# Patient Record
Sex: FEMALE | Race: WHITE | NOT HISPANIC OR LATINO | Employment: FULL TIME | URBAN - METROPOLITAN AREA
[De-identification: names, ages, dates, MRNs, and addresses within clinical notes are randomized per-mention and may not be internally consistent; named-entity substitution may affect disease eponyms.]

---

## 2022-03-20 NOTE — PATIENT INSTRUCTIONS
Pap every 3-5 years if normal,, exercise most days of week, obtain appropriate diet and hydration, Calcium 1000mg + 600 vit D daily, birth control as directed  Annual mammogram starting at age 34, monthly breast self exam    D/C adderall and start PNV prior to conception Discussed AP to track cycles, Basal temp ovulation predictor kits   Healthy diet If no conception after 6 months of actively trying to schedule appointment for further eval

## 2022-03-21 ENCOUNTER — OFFICE VISIT (OUTPATIENT)
Dept: OBGYN CLINIC | Facility: CLINIC | Age: 32
End: 2022-03-21
Payer: COMMERCIAL

## 2022-03-21 VITALS
BODY MASS INDEX: 27.9 KG/M2 | SYSTOLIC BLOOD PRESSURE: 128 MMHG | DIASTOLIC BLOOD PRESSURE: 80 MMHG | HEIGHT: 62 IN | WEIGHT: 151.6 LBS

## 2022-03-21 DIAGNOSIS — Z80.3 FH: BREAST CANCER: ICD-10-CM

## 2022-03-21 DIAGNOSIS — Z12.4 ENCOUNTER FOR PAPANICOLAOU SMEAR FOR CERVICAL CANCER SCREENING: ICD-10-CM

## 2022-03-21 DIAGNOSIS — Z01.419 ENCOUNTER FOR GYNECOLOGICAL EXAMINATION WITHOUT ABNORMAL FINDING: Primary | ICD-10-CM

## 2022-03-21 DIAGNOSIS — F90.9 ATTENTION DEFICIT HYPERACTIVITY DISORDER (ADHD), UNSPECIFIED ADHD TYPE: ICD-10-CM

## 2022-03-21 DIAGNOSIS — Z30.011 ENCOUNTER FOR PRESCRIPTION OF ORAL CONTRACEPTIVES: ICD-10-CM

## 2022-03-21 PROCEDURE — S0610 ANNUAL GYNECOLOGICAL EXAMINA: HCPCS | Performed by: NURSE PRACTITIONER

## 2022-03-21 RX ORDER — DEXTROAMPHETAMINE SACCHARATE, AMPHETAMINE ASPARTATE, DEXTROAMPHETAMINE SULFATE AND AMPHETAMINE SULFATE 2.5; 2.5; 2.5; 2.5 MG/1; MG/1; MG/1; MG/1
10 TABLET ORAL 2 TIMES DAILY
COMMUNITY
Start: 2022-02-28 | End: 2022-03-30

## 2022-03-21 RX ORDER — DESOGESTREL AND ETHINYL ESTRADIOL 0.15-0.03
1 KIT ORAL DAILY
Qty: 84 TABLET | Refills: 3 | Status: SHIPPED | OUTPATIENT
Start: 2022-03-21 | End: 2022-04-15 | Stop reason: SDUPTHER

## 2022-03-21 RX ORDER — DESOGESTREL AND ETHINYL ESTRADIOL 0.15-0.03
1 KIT ORAL DAILY
COMMUNITY
Start: 2022-01-30 | End: 2022-03-21 | Stop reason: SDUPTHER

## 2022-03-21 NOTE — PROGRESS NOTES
Assessment/Plan:  Pap every 3-5 years if normal,, exercise most days of week, obtain appropriate diet and hydration, Calcium 1000mg + 600 vit D daily, birth control as directed  Annual mammogram starting at age 34, monthly breast self exam    D/C adderall and start PNV prior to conception Discussed AP to track cycles, Basal temp ovulation predictor kits  Healthy diet If no conception after 6 months of actively trying to schedule appointment for further eval        Diagnoses and all orders for this visit:    Encounter for gynecological examination without abnormal finding  -     Thinprep Tis Pap (Refl) HPV mRNA E6/E7    Encounter for Papanicolaou smear for cervical cancer screening  -     Thinprep Tis Pap (Refl) HPV mRNA E6/E7    FH: breast cancer  Comments:  MGM in 76s MGGM 90s    Attention deficit hyperactivity disorder (ADHD), unspecified ADHD type  Comments:  Discontinue Aderall prior to conception    Encounter for prescription of oral contraceptives  -     Enskyce 0 15-30 MG-MCG per tablet; Take 1 tablet by mouth daily    Other orders  -     amphetamine-dextroamphetamine (ADDERALL) 10 mg tablet; Take 10 mg by mouth 2 (two) times a day  -     Discontinue: Enskyce 0 15-30 MG-MCG per tablet; Take 1 tablet by mouth daily  -     Multiple Vitamins-Minerals (WOMENS MULTI GUMMIES PO)  -     Fexofenadine-Pseudoephedrine (ALLEGRA-D 24 HOUR PO); Take 1 capsule by mouth daily          Subjective:      Patient ID: Dorinda Bonilla is a 33 y o  female  New pt here for annual gyn [id number] conception in next 6-12 months  H/o ADHD on Adderall On OCP periods monthly Doing well on Enskyce has issues with generic with mood and prolonged bleeding  Has been on OCP since 16  Periods prior to 16 she thinks were regular  Denies abdominal or pelvic pain  H/o right ovarian cystectomy Bowel and bladder are normal Last pap 2020 normal No h o abn pap or STD   for [address] [place] [address]          The following portions of the patient's history were reviewed and updated as appropriate: allergies, current medications, past family history, past medical history, past social history, past surgical history and problem list     Review of Systems   Constitutional: Negative for fatigue and unexpected weight change  Gastrointestinal: Negative for abdominal distention, abdominal pain, constipation and diarrhea  Genitourinary: Negative for difficulty urinating, dyspareunia, dysuria, frequency, genital sores, menstrual problem, pelvic pain, urgency, vaginal bleeding, vaginal discharge and vaginal pain  Neurological: Negative for headaches  Psychiatric/Behavioral: Negative  Negative for dysphoric mood  The patient is not nervous/anxious  Objective:      /80   Ht 5' 2" (1 575 m)   Wt 68 8 kg (151 lb 9 6 oz)   LMP 03/21/2022 (Exact Date)   Breastfeeding No   BMI 27 73 kg/m²          Physical Exam  Vitals and nursing note reviewed  Constitutional:       General: She is not in acute distress  Appearance: Normal appearance  HENT:      Head: Normocephalic and atraumatic  Pulmonary:      Effort: Pulmonary effort is normal    Chest:   Breasts: Breasts are symmetrical       Right: Normal  No mass, nipple discharge, skin change, tenderness or axillary adenopathy  Left: Normal  No mass, nipple discharge, skin change, tenderness or axillary adenopathy  Abdominal:      General: There is no distension  Palpations: Abdomen is soft  Tenderness: There is no abdominal tenderness  There is no guarding or rebound  Genitourinary:     General: Normal vulva  Exam position: Lithotomy position  Labia:         Right: No rash, tenderness, lesion or injury  Left: No rash, tenderness, lesion or injury  Urethra: No prolapse, urethral pain, urethral swelling or urethral lesion  Vagina: Normal  No erythema or lesions        Cervix: No cervical motion tenderness, discharge, lesion or cervical bleeding  Uterus: Normal        Adnexa: Right adnexa normal and left adnexa normal         Right: No mass or tenderness  Left: No mass or tenderness  Rectum: No mass or external hemorrhoid  Comments: Start of menses PAP from cervix   Musculoskeletal:         General: Normal range of motion  Lymphadenopathy:      Upper Body:      Right upper body: No axillary adenopathy  Left upper body: No axillary adenopathy  Lower Body: No right inguinal adenopathy  No left inguinal adenopathy  Skin:     General: Skin is warm and dry  Neurological:      Mental Status: She is alert and oriented to person, place, and time  Psychiatric:         Mood and Affect: Mood normal          Behavior: Behavior normal          Thought Content:  Thought content normal          Judgment: Judgment normal

## 2022-03-23 LAB
CLINICAL INFO: NORMAL
CYTO CVX: NORMAL
CYTOLOGY CMNT CVX/VAG CYTO-IMP: NORMAL
DATE PREVIOUS BX: NORMAL
LMP START DATE: NORMAL
SL AMB PREV. PAP:: NORMAL
SPECIMEN SOURCE CVX/VAG CYTO: NORMAL

## 2023-02-24 ENCOUNTER — TELEPHONE (OUTPATIENT)
Dept: OBGYN CLINIC | Facility: CLINIC | Age: 33
End: 2023-02-24

## 2023-02-24 NOTE — TELEPHONE ENCOUNTER
2/24/23 patient has an upcoming FPN appt at 9:20 am with nurse and 10:00 am with Dr Darius Steinberg  This is patient's first pregnancy and she is a current pt of our so no MR needed

## 2023-03-28 ENCOUNTER — INITIAL PRENATAL (OUTPATIENT)
Dept: OBGYN CLINIC | Facility: CLINIC | Age: 33
End: 2023-03-28

## 2023-03-28 VITALS
DIASTOLIC BLOOD PRESSURE: 70 MMHG | SYSTOLIC BLOOD PRESSURE: 128 MMHG | HEIGHT: 62 IN | BODY MASS INDEX: 29.63 KG/M2 | WEIGHT: 161 LBS

## 2023-03-28 DIAGNOSIS — O09.511 PRIMIGRAVIDA OF ADVANCED MATERNAL AGE IN FIRST TRIMESTER: ICD-10-CM

## 2023-03-28 DIAGNOSIS — O09.519 ADVANCED MATERNAL AGE, PRIMIGRAVIDA, ANTEPARTUM: Primary | ICD-10-CM

## 2023-03-28 DIAGNOSIS — Z3A.08 8 WEEKS GESTATION OF PREGNANCY: ICD-10-CM

## 2023-03-28 DIAGNOSIS — Z71.85 VACCINE COUNSELING: ICD-10-CM

## 2023-03-28 DIAGNOSIS — G43.009 MIGRAINE WITHOUT AURA AND WITHOUT STATUS MIGRAINOSUS, NOT INTRACTABLE: ICD-10-CM

## 2023-03-28 DIAGNOSIS — N91.2 AMENORRHEA: ICD-10-CM

## 2023-03-28 DIAGNOSIS — Z36.89 ENCOUNTER FOR OTHER SPECIFIED ANTENATAL SCREENING: Primary | ICD-10-CM

## 2023-03-28 LAB
EXTERNAL CHLAMYDIA SCREEN: NEGATIVE
EXTERNAL GONORRHEA SCREEN: NEGATIVE
SL AMB  POCT GLUCOSE, UA: NEGATIVE
SL AMB POCT URINE PROTEIN: NEGATIVE

## 2023-03-28 NOTE — ASSESSMENT & PLAN NOTE
H/o migraines - usually around periods  Takes OTC medication  None since conceived  Discussed OTC acetaminophen and caffeine 200mg daily, PRN as first-line for headache in pregnancy  Encouraged to consider initiation of magnesium 400 mg PO daily and vitamin B2 400 mg PO daily for migraine prevention

## 2023-03-28 NOTE — PATIENT INSTRUCTIONS
- Call office if severe pain, bleeding or leaking of fluid  - Continue prenatal vitamin and all prescribed medications  To decrease risk of Preeclampsia in pregnancy - please take baby aspirin (162mg) daily at night from 12 weeks until 36 weeks of pregnancy      Supplements to prevent or decrease headaches/migraines in pregnancy:   - Magnesium 400mg oral daily   - Vit B2 400mg oral daily

## 2023-03-28 NOTE — ASSESSMENT & PLAN NOTE
Reviewed age makes patient Advanced Maternal Age (AMA) in pregnancy  Increase in chromosomal disorders, hypertension in pregnancy/preeclampsia, gestational diabetes in pregnancy,  birth and stillbirth  Reviewed options for aneuploidy testing  To decrease risk of Preeclampsia in pregnancy - please take baby aspirin (162mg) daily at night from 12 weeks until 36 weeks of pregnancy

## 2023-03-28 NOTE — PROGRESS NOTES
OB INTAKE INTERVIEW  Patient is 27 y  o who presents for OB intake at 8 5wks  She is accompanied by: Spouse  The father of her baby Wilmer Stephenson) is involved in the pregnancy    Last Menstrual Period: 2023  Ultrasound: Measured 8 weeks 5 days on 2023  Estimated Date of Delivery: 2023 confirmed by ultrasound  Signs/Symptoms of Pregnancy  Current pregnancy symptoms: Fatigue, breast tenderness, nausea, no vomiting  Constipation YES  Headaches no  Cramping/spotting no  PICA cravings no    Diabetes-  Body mass index is 29 45 kg/m²  If patient has 1 or more, please order early 1 hour GTT  History of GDM no  BMI >30 no  History of PCOS or current metformin use no  History of LGA/macrosomic infant (4000g/9lbs) no    If patient has 2 or more, please order early 1 hour GTT  AMA YES  First degree relative with type 2 diabetes no  History of chronic HTN, hyperlipidemia, elevated A1C no  High risk race (, , ,  or ) no    Hypertension- if you answer yes, please order preeclampsia labs (cbc, comprehensive metabolic panel, urine protein creatinine ratio, uric acid)  History of of chronic HTN no  History of gestational HTN no  History of preeclampsia, eclampsia, or HELLP syndrome no  History of diabetes no  History of lupus, autoimmune disease, kidney disease, antiphospholipid syndrome, rheumatoid arthritis, sjogren's syndrome no    Thyroid- if yes order TSH with reflex T4 (Unless TSH value on file within last 4 weeks then do not need to order)  History of thyroid disease no    Bleeding Disorder or Hx of DVT-patient or first degree relative with history of  Order the following if not done previously     (Factor V, antithrombin III, prothrombin gene mutation, protein C and S Ag, lupus anticoagulant, anticardiolipin, beta-2 glycoprotein)   no    OB/GYN-  History of abnormal pap smear no  History of HPV no  History of Herpes/HSV no  History of other STI (gonorrhea, chlamydia, trich) (or current partner with Hep B, Hep C, or HIV) no  History of prior  no  History of prior  no  History of  delivery prior to 36 weeks 6 days no  History of blood transfusion no  Ok for blood transfusion -Yes    Substance screening-   History of tobacco use no  Currently using tobacco no  Currently using alcohol no  Presently using drugs no  Past drug use (if yes, order urine drug screening panel)  no  IV drug use (if yes, order urine drug screening panel) no  Partner drug use (if yes, order urine drug screening panel) no  Parent/Family drug use (do not order urine drug screening panel just for family hx) no    Depression Screening-  PHQ-9 Score: (4)     MRSA Screening-   Does the pt have a hx of MRSA? no  If yes- please follow MRSA protocol and obtain a nasal swab for MRSA culture at 12 week visit  Immunizations:  Influenza vaccine given this season (ask date) Yes, 2022   Discussed Tdap vaccine (ask date) -Yes  Discussed COVID Vaccine (request pt upload card or bring to next visit) Yes,vaccinated  Genetic/MFM-  Do you or your partner have a history of any of the following in yourselves or first degree relatives? Cystic fibrosis no  Spinal muscular atrophy no  Hemoglobinopathy/Sickle Cell/Thalassemia no  Fragile X Intellectual Disability no    If yes, discuss carrier screening and recommend consultation with MFM/genetic counseling  If no, discuss option for carrier screening and/or genetic testing with Nuchal Ultrasound  Patient interested -Yes pt interested in the NIPT and declined CF and SMA carrier screening     Appointment at [address] -Patient will call to schedule    Interview education  [facility] Pregnancy Essentials Book reviewed and discussed -Yes      Prenatal lab work scripts -Yes    Extra labs ordered:  None     The patient has a history now or in prior pregnancy notable for:  None      Details that I feel the provider should be aware of: ADHD, Migraines     The patient was oriented to our practice, reviewed delivering physicians and [place] in [facility] for Delivery  All questions were answered      Interviewed by: Kofi John RN

## 2023-03-28 NOTE — PROGRESS NOTES
First OB Visit  [address] [place] [address], [address], [facility], [address] [facility]    Assessment/Plan:  Jonas Cuellar is a 27y o  year old  at 6w9d who presents for initial prenatal visit  Final NATA: 2023, by Last Menstrual Period      Supervision of normal pregnancy  - Aneuploidy screening discussed  Patient is undecided regarding aneuploidy screening   - Routine cervical cancer screening: Pap Up to date  - Routine STI Screening: GC/Chlamydia sent today  HIV/Hep B/Hep C/Syphilis ordered in prenatal panel   - Patient Education: Patient was counseled regarding diet, exercise, weight gain, foods to avoid, vaccines in pregnancy, aneuploidy screening, travel precautions to include seat belt use and VTE risk reduction  She has been provided our pregnancy packet which includes how and when to contact providers, medication recommendations, dietary suggestions, breastfeeding information as well as websites for additional information, hospital and delivery concerns  Additional Pregnancy Problems:   1  Advanced maternal age, primigravida, antepartum  Assessment & Plan:  Reviewed age makes patient Advanced Maternal Age (AMA) in pregnancy  Increase in chromosomal disorders, hypertension in pregnancy/preeclampsia, gestational diabetes in pregnancy,  birth and stillbirth  Reviewed options for aneuploidy testing  To decrease risk of Preeclampsia in pregnancy - please take baby aspirin (162mg) daily at night from 12 weeks until 36 weeks of pregnancy  2  Migraine without aura and without status migrainosus, not intractable  Assessment & Plan:  H/o migraines - usually around periods  Takes OTC medication  None since conceived  Discussed OTC acetaminophen and caffeine 200mg daily, PRN as first-line for headache in pregnancy  Encouraged to consider initiation of magnesium 400 mg PO daily and vitamin B2 400 mg PO daily for migraine prevention         3  Vaccine counseling  Comments:  Has had Covid vaccine in past but not recent Bivalent booster  A/P:   Reviewed increase risk of maternal disease to patients who contract covid during pregnancy, increase risk complications and hospitalization  Updated covid 19 booster is recommended in pregnancy, safe and effective, in all trimesters   Informed and encouraged to get updated booster at local pharmacy  4  Amenorrhea  -     AMB US OB < 14 weeks single or first gestation level 1    5  8 weeks gestation of pregnancy      MFM referral given for early anatomy and aneuploidy screening, due 11-13 weeks  Next OB visit: 4 weeks    Subjective:   CC:  Desires prenatal care  Asha Henry is a 27 y o  [place] female who presents for prenatal care  Patient's last menstrual period was 2023  Which would make her 8 weeks and 5 days pregnant today  Pregnancy ROS: no leakage of fluid, pelvic pain, or vaginal bleeding  mild nausea, no vomiting  OB History    Para Term  AB Living   1 0 0 0 0 0   SAB IAB Ectopic Multiple Live Births   0 0 0 0 0      # Outcome Date GA Lbr Chu/2nd Weight Sex Delivery Anes PTL Lv   1 Current                The following portions of the patient's history were reviewed and updated as appropriate: She  has a past medical history of ADHD, Migraine (2006), and Seasonal allergies  She  has a past surgical history that includes Ovarian cyst removal (Right, ); Rotator cuff repair (Right, ); and [place] tooth extraction ()  Her family history includes Breast cancer in her maternal grandmother and other; Cancer in her maternal grandfather; Diabetes in her maternal grandfather and paternal grandmother; Diabetes type II in her paternal grandmother; Heart disease in her maternal grandfather; Migraines in her mother  She  reports that she has never smoked  She has never used smokeless tobacco  She reports that she does not currently use alcohol   She reports that she does not use drugs  Current Outpatient Medications   Medication Sig Dispense Refill   • Prenatal MV & Min w/FA-DHA (ONE A DAY PRENATAL PO) Take by mouth       No current facility-administered medications for this visit  She has No Known Allergies         Objective:  LMP 2023   Pregravid Weight/BMI: 73 kg (161 lb) (BMI 29 44)  Current Weight:     Total Weight Gain: 0 kg (0 lb)   Pre- Vitals    Flowsheet Row Most Recent Value   Prenatal Assessment    Fetal Heart Rate 180   Movement Absent   Prenatal Vitals    Urine Albumin/Glucose    Dilation/Effacement/Station    Cervical Dilation 0   Cervical Effacement 0   Fetal Station -5   Vaginal Drainage    Draining Fluid No   Edema          General: Well appearing, no distress  Breasts: Normal bilaterally, nontender without masses, asymmetry, or nipple discharge  Abdomen: Soft, gravid, nontender  : Urethra normal  Normal labia majora and minora  Vagina normal   No vaginal bleeding  No vaginal discharge  Cervix closed  Uterus non-tender  Extremities: Warm and well perfused  Non tender  No edema      Ultrasound: ultrasound confirmed SLIUP, see report for details

## 2023-03-29 LAB
C TRACH RRNA SPEC QL NAA+PROBE: NOT DETECTED
N GONORRHOEA RRNA SPEC QL NAA+PROBE: NOT DETECTED

## 2023-03-31 LAB
EXTERNAL ANTIBODY SCREEN: NORMAL
EXTERNAL HEMOGLOBIN: 11.9 G/DL
EXTERNAL HEPATITIS B SURFACE ANTIGEN: NORMAL
EXTERNAL HIV-1/2 AB-AG: NORMAL
EXTERNAL PLATELET COUNT: 308 K/ÂΜL
EXTERNAL RH FACTOR: POSITIVE
EXTERNAL RUBELLA IGG QUANTITATION: NORMAL
EXTERNAL SYPHILIS TOTAL IGG/IGM SCREENING: NORMAL

## 2023-04-01 LAB
ABO GROUP BLD: NORMAL
APPEARANCE UR: CLEAR
BACTERIA UR QL AUTO: NORMAL /HPF
BASOPHILS # BLD AUTO: 38 CELLS/UL (ref 0–200)
BASOPHILS NFR BLD AUTO: 0.5 %
BILIRUB UR QL STRIP: NEGATIVE
BLD GP AB SCN SERPL QL: NORMAL
COLOR UR: YELLOW
EOSINOPHIL # BLD AUTO: 68 CELLS/UL (ref 15–500)
EOSINOPHIL NFR BLD AUTO: 0.9 %
ERYTHROCYTE [DISTWIDTH] IN BLOOD BY AUTOMATED COUNT: 12.4 % (ref 11–15)
GLUCOSE UR QL STRIP: NEGATIVE
HBV SURFACE AG SERPL QL IA: NORMAL
HCT VFR BLD AUTO: 36 % (ref 35–45)
HCV AB S/CO SERPL IA: <0.02
HCV AB SERPL QL IA: NORMAL
HGB BLD-MCNC: 11.9 G/DL (ref 11.7–15.5)
HGB UR QL STRIP: NEGATIVE
HIV 1+2 AB+HIV1 P24 AG SERPL QL IA: NORMAL
HYALINE CASTS #/AREA URNS LPF: NORMAL /LPF
KETONES UR QL STRIP: NEGATIVE
LEUKOCYTE ESTERASE UR QL STRIP: NEGATIVE
LYMPHOCYTES # BLD AUTO: 2257 CELLS/UL (ref 850–3900)
LYMPHOCYTES NFR BLD AUTO: 29.7 %
MCH RBC QN AUTO: 30.2 PG (ref 27–33)
MCHC RBC AUTO-ENTMCNC: 33.1 G/DL (ref 32–36)
MCV RBC AUTO: 91.4 FL (ref 80–100)
MONOCYTES # BLD AUTO: 646 CELLS/UL (ref 200–950)
MONOCYTES NFR BLD AUTO: 8.5 %
NEUTROPHILS # BLD AUTO: 4590 CELLS/UL (ref 1500–7800)
NEUTROPHILS NFR BLD AUTO: 60.4 %
NITRITE UR QL STRIP: NEGATIVE
PH UR STRIP: 7.5 [PH] (ref 5–8)
PLATELET # BLD AUTO: 308 THOUSAND/UL (ref 140–400)
PMV BLD REES-ECKER: 10.6 FL (ref 7.5–12.5)
PROT UR QL STRIP: NEGATIVE
RBC # BLD AUTO: 3.94 MILLION/UL (ref 3.8–5.1)
RBC #/AREA URNS HPF: NORMAL /HPF
RH BLD: NORMAL
RPR SER QL: NORMAL
RUBV IGG SERPL IA-ACNC: 2.68 INDEX
SP GR UR STRIP: 1 (ref 1–1.03)
SQUAMOUS #/AREA URNS HPF: NORMAL /HPF
WBC # BLD AUTO: 7.6 THOUSAND/UL (ref 3.8–10.8)
WBC #/AREA URNS HPF: NORMAL /HPF

## 2023-04-25 ENCOUNTER — ROUTINE PRENATAL (OUTPATIENT)
Dept: OBGYN CLINIC | Facility: CLINIC | Age: 33
End: 2023-04-25

## 2023-04-25 VITALS — DIASTOLIC BLOOD PRESSURE: 56 MMHG | WEIGHT: 164.6 LBS | SYSTOLIC BLOOD PRESSURE: 116 MMHG | BODY MASS INDEX: 30.11 KG/M2

## 2023-04-25 DIAGNOSIS — G43.009 MIGRAINE WITHOUT AURA AND WITHOUT STATUS MIGRAINOSUS, NOT INTRACTABLE: ICD-10-CM

## 2023-04-25 DIAGNOSIS — O09.519 ADVANCED MATERNAL AGE, PRIMIGRAVIDA, ANTEPARTUM: Primary | ICD-10-CM

## 2023-04-25 DIAGNOSIS — Z3A.12 12 WEEKS GESTATION OF PREGNANCY: ICD-10-CM

## 2023-04-25 LAB
SL AMB  POCT GLUCOSE, UA: NEGATIVE
SL AMB POCT URINE PROTEIN: NEGATIVE

## 2023-04-25 RX ORDER — DOCUSATE SODIUM 100 MG/1
100 CAPSULE, LIQUID FILLED ORAL 2 TIMES DAILY
COMMUNITY

## 2023-04-25 NOTE — PROGRESS NOTES
Please refer to the [facility] ultrasound report in Ob Procedures for additional information regarding today's visit

## 2023-04-25 NOTE — PATIENT INSTRUCTIONS
NUTRITION IN PREGNANCY  Good Nutrition is a VERY important part of having a healthy pregnancy and healthy baby  You should follow a healthy diet which include the following: * Vegetables (which are dark green and leafy): at least 2 servings each day   * Protein (meat, eggs, beans, nuts, peanut butter): 3-4 servings each day   * Breads/whole grains (bread, pasta, rice, tortillas, potatoes): 3 servings each day   * Dairy (milk, yogurt, cheese): 3-4 servings each day   * Water: 6-8 glasses per day   * Calories: approximately 2000 to 2200 calories per day     WEIGHT GAIN   Recommended weight gain for you during your pregnancy is based on your body mass index (BMI) at the time that you became pregnant  Pre-pregnant BMI Recommended weight gain   Underweight (BMI less than 18 5) 28 to 40 pounds   Normal weight (BMI 18 5-24 9) 25 to 35 pounds   Overweight (BMI 25-29 9) 15 to 25 pounds   Obese (BMI 30 or greater) 11 to 20 pounds     FOOD SAFETY   It is VERY important to eat only safely-prepared foods during pregnancy as you and your baby have a higher risk than usual for being affected by foodborne illnesses    Follow these steps to ensure that you and your baby are safe from foodborne illnesses while you are pregnant:   wash hands thoroughly with warm water and soap before and after handling any foods   wash cutting boards, dishes, utensils, and countertops with hot water and soap before and after preparing any foods   rinse raw fruits and vegetables thoroughly under running water before eating   keep raw meat and seafood separate from other foods and use different cutting boards/utensils to handle raw meat than for other foods   put cooked food on a freshly clean plate   cook all of your foods thoroughly   discard foods that have been left out for more than 2 hours   refrigerate or freeze any foods than can spoil     There are three particular foodborne risks that you should be aware of and avoid as they can cause serious harm to your unborn child  * Listeria (a harmful bacteria)   don’t eat hot dogs or deli meats (unless they’re reheated until steaming hot)   don’t eat soft cheeses (such as Feta, Brie, Camembert) unless they are specifically labeled as being “made with pasteurized milk”   don’t drink raw (unpasteurized) milk   don’t eat refrigerated pates or meat spreads   don’t eat refrigerated smoked seafood unless it’s in a cooked dish like a casserole     * Mercury (a metal which is found in certain fish in high levels)   don’t eat shark, tilefish, sydnie mackerel, or swordfish   don’t eat more than 12 ounces per week of shrimp, salmon, pollock, or catfish   when eating tuna fish, you can have up to 6 ounces per week of canned albacore tuna OR up to 12 ounces of canned light tuna     * Toxoplasma (a harmful parasite)   cook meat thoroughly before eating   wear gloves when gardening or handling sand from a child’s sandbox   if you ha tve a cat, have someone else change the litter box while you are pregnant      if you HAVE to clean it yourself, be sure to wash your hands thoroughly afterwards with warm water and soap    don’t get a NEW cat while you are pregnant

## 2023-04-25 NOTE — PROGRESS NOTES
Routine Prenatal Visit  [address] [facility], [address], [facility], [address] [facility]    Assessment/Plan:  Pancho Greene is a 27y o  year old  at 12w5d who presents for routine prenatal visit  1  Advanced maternal age, primigravida, antepartum    2  12 weeks gestation of pregnancy    3  Migraine without aura and without status migrainosus, not intractable      Labs reviewed   MFM  Consult  Tomorrow   No  Bleeding,  + fatigue  To start   ASA  162 mg   This week     Subjective:     CC: Prenatal care    Hernandez Batista is a 27 y o  [place]ildegard Reges female who presents for routine prenatal care at 12w5d  Pregnancy ROS: no  leakage of fluid, pelvic pain, or vaginal bleeding  The following portions of the patient's history were reviewed and updated as appropriate: allergies, current medications, past family history, past medical history, obstetric history, gynecologic history, past social history, past surgical history and problem list       Objective:  /56 (BP Location: Left arm, Patient Position: Sitting, Cuff Size: Standard)   Wt 74 7 kg (164 lb 9 6 oz)   LMP 2023   BMI 30 11 kg/m²   Pregravid Weight/BMI: 73 kg (161 lb) (BMI 29 44)  Current Weight: 74 7 kg (164 lb 9 6 oz)   Total Weight Gain: 1 633 kg (3 lb 9 6 oz)   Pre- Vitals    Flowsheet Row Most Recent Value   Prenatal Assessment    Fetal Heart Rate 162   Movement Absent   Prenatal Vitals    Blood Pressure 116/56   Weight - Scale 74 7 kg (164 lb 9 6 oz)   Urine Albumin/Glucose    Dilation/Effacement/Station    Vaginal Drainage    Edema    LLE Edema None   RLE Edema None   Facial Edema None           General: Well appearing, no distress  Respiratory: Unlabored breathing  Cardiovascular: Regular rate  Abdomen: Soft, gravid, nontender  Fundal Height: Appropriate for gestational age  Extremities: Warm and well perfused  Non tender

## 2023-04-26 ENCOUNTER — ROUTINE PRENATAL (OUTPATIENT)
Dept: PERINATAL CARE | Facility: OTHER | Age: 33
End: 2023-04-26

## 2023-04-26 VITALS
WEIGHT: 163.8 LBS | DIASTOLIC BLOOD PRESSURE: 70 MMHG | HEIGHT: 62 IN | BODY MASS INDEX: 30.14 KG/M2 | SYSTOLIC BLOOD PRESSURE: 136 MMHG | HEART RATE: 83 BPM

## 2023-04-26 DIAGNOSIS — Z3A.12 12 WEEKS GESTATION OF PREGNANCY: ICD-10-CM

## 2023-04-26 DIAGNOSIS — Z36.89 ENCOUNTER FOR OTHER SPECIFIED ANTENATAL SCREENING: ICD-10-CM

## 2023-04-26 DIAGNOSIS — O09.511 ELDERLY PRIMIGRAVIDA, FIRST TRIMESTER: Primary | ICD-10-CM

## 2023-04-26 RX ORDER — ASPIRIN 81 MG/1
162 TABLET, CHEWABLE ORAL
Qty: 180 TABLET | Refills: 1 | Status: SHIPPED | OUTPATIENT
Start: 2023-04-26 | End: 2023-07-25

## 2023-04-26 NOTE — LETTER
April 26, 2023     Eugenio Gruber MD  [facility] [address]  [address] [address]  [place] [id number]    Patient: Attila Longoria   YOB: 1990   Date of Visit: 4/26/2023       Dear Dr Alek Irwin: Thank you for referring Attila Longoria to me for evaluation  Below are my notes for this consultation  If you have questions, please do not hesitate to call me  I look forward to following your patient along with you           Sincerely,        Marliyn Smith MD        CC: No Recipients  Marilyn Smith MD  4/25/2023  4:37 PM  Sign when Signing Visit  Please refer to the [facility] ultrasound report in Ob Procedures for additional information regarding today's visit

## 2023-05-02 ENCOUNTER — PATIENT MESSAGE (OUTPATIENT)
Dept: OBGYN CLINIC | Facility: CLINIC | Age: 33
End: 2023-05-02

## 2023-05-02 NOTE — PATIENT COMMUNICATION
Patient returned call this morning  Patient currently 13 5wks pregnant  She states that it was one time she saw the dark brown discharge in the water  She states when she went to the bathroom again today and wiped she did not see any further discharge  Denies vaginal irritation/burning, abnormal vaginal odor, itching, or further discharge/spotting with wiping or in the toilet  The cramping/stretching in the pelvic region is on and off and not severe; advised her it is her round ligament stretching but it should not be consistent or severe doubled of pain  Advised her brown blood/discharge is indicator of old blood working it way out but should never be bright red color which indicate active bleeding and we need to know about  Advised her to continue to monitor and if she develop doubled over pain, vaginal irritation, abnormal vaginal odor, worsening/increased discolored discharge, or bleeding/spotting bright red to please give us a call back  She voiced appreciation

## 2023-05-26 ENCOUNTER — ROUTINE PRENATAL (OUTPATIENT)
Dept: OBGYN CLINIC | Facility: CLINIC | Age: 33
End: 2023-05-26

## 2023-05-26 VITALS — WEIGHT: 168 LBS | DIASTOLIC BLOOD PRESSURE: 60 MMHG | BODY MASS INDEX: 30.73 KG/M2 | SYSTOLIC BLOOD PRESSURE: 102 MMHG

## 2023-05-26 DIAGNOSIS — Z36.89 ENCOUNTER FOR OTHER SPECIFIED ANTENATAL SCREENING: ICD-10-CM

## 2023-05-26 DIAGNOSIS — Z3A.17 17 WEEKS GESTATION OF PREGNANCY: ICD-10-CM

## 2023-05-26 DIAGNOSIS — O09.519 ADVANCED MATERNAL AGE, PRIMIGRAVIDA, ANTEPARTUM: Primary | ICD-10-CM

## 2023-05-26 LAB
SL AMB  POCT GLUCOSE, UA: NEGATIVE
SL AMB POCT URINE PROTEIN: NEGATIVE

## 2023-05-26 NOTE — PROGRESS NOTES
Routine Prenatal Visit  [address] [facility], [address], [facility], [address]    Assessment/Plan:  Blair Bowens is a 27y o  year old  at 17w1d who presents for routine prenatal visit  1  Advanced maternal age, primigravida, antepartum  Assessment & Plan:  - Continue  mg PO daily until 36 weeks  2  17 weeks gestation of pregnancy  Assessment & Plan:  - Prenatal lab results reviewed  - Aneuploidy/ONTD screening reviewed  Patient's NIPS results low-risk  Order for msAFP provided today  - First trimester MFM consult report reviewed  Level II ultrasound is scheduled  - Problem list updated, results console reviewed and updated with pertinent prenatal labs  - PMH, PSH, medications reviewed and updated as needed  - Return to office in 4 wk(s) for routine prenatal care  Orders:  -     POCT urine dip    3  Encounter for other specified  screening  -     Alpha fetoprotein, maternal; Future  -     Alpha fetoprotein, maternal          Subjective:   Nely Marcial is a 27 y o   who presents for routine prenatal care at 17w1d  Complaints today: NO  LOF: No; VB: No; Contractions: No    Objective:  /60   Wt 76 2 kg (168 lb)   LMP 2023   BMI 30 73 kg/m²     General: Well appearing, no distress  Respiratory: Unlabored breathing  Cardiovascular: Regular rate  Abdomen: Soft, gravid, nontender  Extremities: Warm and well perfused  Non tender      Pregravid Weight/BMI: 73 kg (161 lb) (BMI 29 44)  Current Weight: 76 2 kg (168 lb)   Total Weight Gain: 3 175 kg (7 lb)     Pre- Vitals    Flowsheet Row Most Recent Value   Prenatal Assessment    Fetal Heart Rate 145   Movement Absent   Prenatal Vitals    Blood Pressure 102/60   Weight - Scale 76 2 kg (168 lb)   Urine Albumin/Glucose    Dilation/Effacement/Station    Vaginal Drainage    Draining Fluid No   Edema    LLE Edema None   RLE Edema None   Facial Edema None           Jodeen Boeck, MD  5/26/2023 11:28 AM

## 2023-05-26 NOTE — ASSESSMENT & PLAN NOTE
- Prenatal lab results reviewed  - Aneuploidy/ONTD screening reviewed  Patient's NIPS results low-risk  Order for msAFP provided today  - First trimester MFM consult report reviewed  Level II ultrasound is scheduled  - Problem list updated, results console reviewed and updated with pertinent prenatal labs  - PMH, PSH, medications reviewed and updated as needed  - Return to office in 4 wk(s) for routine prenatal care

## 2023-06-02 LAB
# FETUSES US: 1
AFP ADJ MOM SERPL: 0.97
AFP INTERP SERPL-IMP: NORMAL
AFP SERPL-MCNC: 39.3 NG/ML
AGE: NORMAL
DONATED EGG PATIENT QL: NO
GA CLIN EST: 17.9 WEEKS
GA METHOD: NORMAL
HX OF NTD NARR: NO
HX OF TRISOMY 21 NARR: NO
IDDM PATIENT QL: NO
NEURAL TUBE DEFECT RISK FETUS: NORMAL %
SL AMB REPEAT SPECIMEN: NO

## 2023-06-15 NOTE — PATIENT INSTRUCTIONS
Thank you for choosing us for your  care today  If you have any questions about your ultrasound or care, please do not hesitate to contact us or your primary obstetrician  Some general instructions for your pregnancy are:    Exercise: Aim for 22 minutes per day (150 minutes per week) of regular exercise  Walking is great! Nutrition: Choose healthy sources of calcium, iron, and protein  Learn about Preeclampsia: preeclampsia is a common, serious high blood pressure complication in pregnancy  A blood pressure of 771QQQA (systolic or top number) or 56NNPF (diastolic or bottom number) is not normal and needs evaluation by your doctor  Aspirin is sometimes prescribed in early pregnancy to prevent preeclampsia in women with risk factors - ask your obstetrician if you should be on this medication  For more resources, visit:  MapCoverage fi  If you smoke, try to reduce how many cigarettes you smoke or try to quit completely  Do not vape  Other warning signs to watch out for in pregnancy or postpartum: chest pain, obstructed breathing or shortness of breath, seizures, thoughts of hurting yourself or your baby, bleeding, a painful or swollen leg, fever, or headache (see [facility] POST-BIRTH Warning Signs campaign)  If these happen call 911  Itching is also not normal in pregnancy and if you experience this, especially over your hands and feet, potentially worse at night, notify your doctors

## 2023-06-16 ENCOUNTER — ROUTINE PRENATAL (OUTPATIENT)
Dept: PERINATAL CARE | Facility: OTHER | Age: 33
End: 2023-06-16
Payer: COMMERCIAL

## 2023-06-16 VITALS
HEART RATE: 89 BPM | SYSTOLIC BLOOD PRESSURE: 114 MMHG | HEIGHT: 62 IN | DIASTOLIC BLOOD PRESSURE: 68 MMHG | BODY MASS INDEX: 31.83 KG/M2 | WEIGHT: 173 LBS

## 2023-06-16 DIAGNOSIS — Z3A.20 20 WEEKS GESTATION OF PREGNANCY: ICD-10-CM

## 2023-06-16 DIAGNOSIS — O09.519 ADVANCED MATERNAL AGE, PRIMIGRAVIDA, ANTEPARTUM: Primary | ICD-10-CM

## 2023-06-16 DIAGNOSIS — Z36.3 ENCOUNTER FOR ANTENATAL SCREENING FOR MALFORMATIONS: ICD-10-CM

## 2023-06-16 DIAGNOSIS — Z36.86 ENCOUNTER FOR ANTENATAL SCREENING FOR CERVICAL LENGTH: ICD-10-CM

## 2023-06-16 PROCEDURE — 76817 TRANSVAGINAL US OBSTETRIC: CPT | Performed by: OBSTETRICS & GYNECOLOGY

## 2023-06-16 PROCEDURE — 99213 OFFICE O/P EST LOW 20 MIN: CPT | Performed by: OBSTETRICS & GYNECOLOGY

## 2023-06-16 PROCEDURE — 76811 OB US DETAILED SNGL FETUS: CPT | Performed by: OBSTETRICS & GYNECOLOGY

## 2023-06-16 RX ORDER — CETIRIZINE HYDROCHLORIDE 10 MG/1
10 TABLET ORAL AS NEEDED
COMMUNITY

## 2023-06-16 NOTE — PROGRESS NOTES
Ultrasound Probe Disinfection    A transvaginal ultrasound was performed  Prior to use, disinfection was performed with High Level Disinfection Process (Trophon)  Probe serial number [id number]: [id number] was used        Juan Pablo Suarez  06/16/23  2:34 PM

## 2023-06-17 NOTE — PROGRESS NOTES
"[address]: Ms Franky Richards was seen today for anatomic survey and cervical length screening ultrasound  See ultrasound report under \"OB Procedures\" tab  The time spent on this established patient on the encounter date included 5 minutes previsit service time reviewing records and precharting, 5 minutes face-to-face service time counseling regarding results and coordinating care, and  4 minutes charting, totalling 14 minutes  Please don't hesitate to contact our office with any concerns or questions    Hernando Villeda MD      "

## 2023-06-19 ENCOUNTER — ROUTINE PRENATAL (OUTPATIENT)
Dept: OBGYN CLINIC | Facility: CLINIC | Age: 33
End: 2023-06-19
Payer: COMMERCIAL

## 2023-06-19 VITALS
SYSTOLIC BLOOD PRESSURE: 114 MMHG | BODY MASS INDEX: 31.94 KG/M2 | HEIGHT: 62 IN | WEIGHT: 173.6 LBS | DIASTOLIC BLOOD PRESSURE: 72 MMHG

## 2023-06-19 DIAGNOSIS — O09.519 ADVANCED MATERNAL AGE, PRIMIGRAVIDA, ANTEPARTUM: ICD-10-CM

## 2023-06-19 DIAGNOSIS — Z3A.20 20 WEEKS GESTATION OF PREGNANCY: Primary | ICD-10-CM

## 2023-06-19 DIAGNOSIS — G43.009 MIGRAINE WITHOUT AURA AND WITHOUT STATUS MIGRAINOSUS, NOT INTRACTABLE: ICD-10-CM

## 2023-06-19 LAB
SL AMB  POCT GLUCOSE, UA: NORMAL
SL AMB POCT URINE PROTEIN: NORMAL

## 2023-06-19 PROCEDURE — 81002 URINALYSIS NONAUTO W/O SCOPE: CPT | Performed by: OBSTETRICS & GYNECOLOGY

## 2023-06-19 PROCEDURE — PNV: Performed by: OBSTETRICS & GYNECOLOGY

## 2023-06-19 NOTE — PROGRESS NOTES
"Routine Prenatal Visit  [address], [address], [facility], [address] [facility]    Assessment/Plan:  Jacob Mcbride is a 26y o  year old  at 22w2d who presents for routine prenatal visit  1  20 weeks gestation of pregnancy  -     POCT urine dip    2  Advanced maternal age, primigravida, antepartum    3  Migraine without aura and without status migrainosus, not intractable        Next OB Visit 4 weeks  Subjective:     CC: Prenatal care    Cary Duque is a 26 y o   female who presents for routine prenatal care at 22w2d  Pregnancy ROS: no leakage of fluid, pelvic pain, or vaginal bleeding  normal fetal movement  The following portions of the patient's history were reviewed and updated as appropriate: allergies, current medications, past family history, past medical history, obstetric history, gynecologic history, past social history, past surgical history and problem list       Objective:  /72 (BP Location: Left arm, Patient Position: Sitting, Cuff Size: Standard)   Ht 5' 2\" (1 575 m)   Wt 78 7 kg (173 lb 9 6 oz)   LMP 2023   BMI 31 75 kg/m²   Pregravid Weight/BMI: 73 kg (161 lb) (BMI 29 44)  Current Weight: 78 7 kg (173 lb 9 6 oz)   Total Weight Gain: 5 715 kg (12 lb 9 6 oz)   Pre- Vitals    Flowsheet Row Most Recent Value   Prenatal Assessment    Fetal Heart Rate 150   Movement Present   Prenatal Vitals    Blood Pressure 114/72   Weight - Scale 78 7 kg (173 lb 9 6 oz)   Urine Albumin/Glucose    Dilation/Effacement/Station    Vaginal Drainage    Draining Fluid No   Edema            General: Well appearing, no distress  Abdomen: Soft, gravid, nontender  Extremities: Non tender    "

## 2023-06-30 ENCOUNTER — TELEPHONE (OUTPATIENT)
Facility: HOSPITAL | Age: 33
End: 2023-06-30

## 2023-06-30 NOTE — TELEPHONE ENCOUNTER
Left voicemail informing patient of the change for her upcoming appointment on 7/17  Requested she give our office a call back at [phone] with any questions or if she would need to reschedule

## 2023-07-17 ENCOUNTER — ULTRASOUND (OUTPATIENT)
Dept: PERINATAL CARE | Facility: OTHER | Age: 33
End: 2023-07-17
Payer: COMMERCIAL

## 2023-07-17 VITALS
WEIGHT: 180.4 LBS | HEART RATE: 83 BPM | BODY MASS INDEX: 33.2 KG/M2 | SYSTOLIC BLOOD PRESSURE: 128 MMHG | HEIGHT: 62 IN | DIASTOLIC BLOOD PRESSURE: 72 MMHG

## 2023-07-17 DIAGNOSIS — O43.102 PLACENTA, ABNORMAL, SECOND TRIMESTER: ICD-10-CM

## 2023-07-17 DIAGNOSIS — Z36.2 ENCOUNTER FOR FOLLOW-UP ULTRASOUND OF FETAL ANATOMY: ICD-10-CM

## 2023-07-17 DIAGNOSIS — Z3A.24 24 WEEKS GESTATION OF PREGNANCY: ICD-10-CM

## 2023-07-17 DIAGNOSIS — O09.519 ADVANCED MATERNAL AGE, PRIMIGRAVIDA, ANTEPARTUM: Primary | ICD-10-CM

## 2023-07-17 DIAGNOSIS — Z36.89 ENCOUNTER FOR ULTRASOUND TO ASSESS FETAL GROWTH: ICD-10-CM

## 2023-07-17 PROBLEM — F90.9 ADHD (ATTENTION DEFICIT HYPERACTIVITY DISORDER): Status: ACTIVE | Noted: 2023-07-17

## 2023-07-17 PROCEDURE — 99213 OFFICE O/P EST LOW 20 MIN: CPT | Performed by: OBSTETRICS & GYNECOLOGY

## 2023-07-17 PROCEDURE — 76816 OB US FOLLOW-UP PER FETUS: CPT | Performed by: OBSTETRICS & GYNECOLOGY

## 2023-07-17 NOTE — PATIENT INSTRUCTIONS
Thank you for choosing us for your  care today. If you have any questions about your ultrasound or care, please do not hesitate to contact us or your primary obstetrician. Some general instructions for your pregnancy are:    Exercise: Aim for 22 minutes per day (150 minutes per week) of regular exercise. Walking is great! Nutrition: Choose healthy sources of calcium, iron, and protein. Learn about Preeclampsia: preeclampsia is a common, serious high blood pressure complication in pregnancy. A blood pressure of 664SEWJ (systolic or top number) or 38NNLS (diastolic or bottom number) is not normal and needs evaluation by your doctor. Aspirin is sometimes prescribed in early pregnancy to prevent preeclampsia in women with risk factors - ask your obstetrician if you should be on this medication. For more resources, visit:  MapCoverage.fi  If you smoke, try to reduce how many cigarettes you smoke or try to quit completely. Do not vape. Other warning signs to watch out for in pregnancy or postpartum: chest pain, obstructed breathing or shortness of breath, seizures, thoughts of hurting yourself or your baby, bleeding, a painful or swollen leg, fever, or headache (see [facility] POST-BIRTH Warning Signs campaign). If these happen call 911. Itching is also not normal in pregnancy and if you experience this, especially over your hands and feet, potentially worse at night, notify your doctors.

## 2023-07-18 ENCOUNTER — ROUTINE PRENATAL (OUTPATIENT)
Dept: OBGYN CLINIC | Facility: CLINIC | Age: 33
End: 2023-07-18
Payer: COMMERCIAL

## 2023-07-18 VITALS
DIASTOLIC BLOOD PRESSURE: 58 MMHG | WEIGHT: 178.4 LBS | BODY MASS INDEX: 32.83 KG/M2 | HEIGHT: 62 IN | SYSTOLIC BLOOD PRESSURE: 110 MMHG

## 2023-07-18 DIAGNOSIS — Z36.89 ENCOUNTER FOR OTHER SPECIFIED ANTENATAL SCREENING: ICD-10-CM

## 2023-07-18 DIAGNOSIS — Z3A.24 24 WEEKS GESTATION OF PREGNANCY: ICD-10-CM

## 2023-07-18 DIAGNOSIS — O09.519 ADVANCED MATERNAL AGE, PRIMIGRAVIDA, ANTEPARTUM: Primary | ICD-10-CM

## 2023-07-18 DIAGNOSIS — O43.199 BILOBED PLACENTA: ICD-10-CM

## 2023-07-18 LAB
SL AMB  POCT GLUCOSE, UA: NEGATIVE
SL AMB POCT URINE PROTEIN: NEGATIVE

## 2023-07-18 PROCEDURE — PNV: Performed by: OBSTETRICS & GYNECOLOGY

## 2023-07-18 PROCEDURE — 81002 URINALYSIS NONAUTO W/O SCOPE: CPT | Performed by: OBSTETRICS & GYNECOLOGY

## 2023-07-18 NOTE — PROGRESS NOTES
[address] [facility]: Ms. Monika Jarrett was seen today for fetal growth and followup missed anatomy ultrasound. See ultrasound report under "OB Procedures" tab. The time spent on this established patient on the encounter date included 5 minutes previsit service time reviewing records and precharting, 10 minutes face-to-face service time counseling regarding results and coordinating care, and  5 minutes charting, totalling 20 minutes. Please don't hesitate to contact our office with any concerns or questions.   Arlene Zaragoza MD

## 2023-07-18 NOTE — PROGRESS NOTES
Routine Prenatal Visit  [address] [place] [address], [address], [facility], [address] [facility],[address]    Assessment/Plan:  Michael Sahu is a 26y.o. year old  at 20w6d who presents for routine prenatal visit. 1. Advanced maternal age, primigravida, antepartum    2. Bilobed placenta    3. 24 weeks gestation of pregnancy  -     POCT urine dip    4. Encounter for other specified  screening  -     Glucose, 1H PG; Future  -     CBC; Future  -     RPR (MONITOR) W/REFL TITER (REFL); Future  -     Glucose, 1H PG  -     CBC  -     RPR (MONITOR) W/REFL TITER (REFL)          Subjective:     CC: Prenatal care    Malachi Post is a 26 y.o. Fredy Guess female who presents for routine prenatal care at 24w5d. Pregnancy ROS: no leakage of fluid, pelvic pain, or vaginal bleeding. normal fetal movement. The following portions of the patient's history were reviewed and updated as appropriate: allergies, current medications, past family history, past medical history, obstetric history, gynecologic history, past social history, past surgical history and problem list.      Objective:  /58   Ht 5' 2" (1.575 m)   Wt 80.9 kg (178 lb 6.4 oz)   LMP 2023   BMI 32.63 kg/m²   Pregravid Weight/BMI: 73 kg (161 lb) (BMI 29.44)  Current Weight: 80.9 kg (178 lb 6.4 oz)   Total Weight Gain: 7.893 kg (17 lb 6.4 oz)   Pre-Hever Vitals    Flowsheet Row Most Recent Value   Prenatal Assessment    Movement Present   Prenatal Vitals    Blood Pressure 110/58   Weight - Scale 80.9 kg (178 lb 6.4 oz)   Urine Albumin/Glucose    Dilation/Effacement/Station    Vaginal Drainage    Edema    LLE Edema Unable to assess   RLE Edema None   Facial Edema None           General: Well appearing, no distress  Respiratory: Unlabored breathing  Cardiovascular: Regular rate. Abdomen: Soft, gravid, nontender  Fundal Height: Appropriate for gestational age. Extremities: Warm and well perfused. Non tender.

## 2023-08-08 ENCOUNTER — PATIENT MESSAGE (OUTPATIENT)
Dept: OBGYN CLINIC | Facility: CLINIC | Age: 33
End: 2023-08-08

## 2023-08-09 NOTE — PATIENT COMMUNICATION
Received & fax breast pump order to [employer]. Laly Mandes a portal message as to this too. Will have forms scanned into pt records via [phone].

## 2023-08-15 ENCOUNTER — ROUTINE PRENATAL (OUTPATIENT)
Dept: OBGYN CLINIC | Facility: CLINIC | Age: 33
End: 2023-08-15
Payer: COMMERCIAL

## 2023-08-15 VITALS
BODY MASS INDEX: 33.38 KG/M2 | DIASTOLIC BLOOD PRESSURE: 60 MMHG | HEIGHT: 62 IN | SYSTOLIC BLOOD PRESSURE: 108 MMHG | WEIGHT: 181.4 LBS

## 2023-08-15 DIAGNOSIS — Z3A.28 28 WEEKS GESTATION OF PREGNANCY: ICD-10-CM

## 2023-08-15 DIAGNOSIS — O43.199 BILOBED PLACENTA: ICD-10-CM

## 2023-08-15 DIAGNOSIS — O09.519 ADVANCED MATERNAL AGE, PRIMIGRAVIDA, ANTEPARTUM: Primary | ICD-10-CM

## 2023-08-15 DIAGNOSIS — Z23 NEED FOR TDAP VACCINATION: ICD-10-CM

## 2023-08-15 LAB
EXTERNAL HEMOGLOBIN: 11.9 G/DL
EXTERNAL PLATELET COUNT: 262 K/ÂΜL
EXTERNAL SYPHILIS TOTAL IGG/IGM SCREENING: NORMAL
SL AMB  POCT GLUCOSE, UA: NEGATIVE
SL AMB POCT URINE PROTEIN: NEGATIVE

## 2023-08-15 PROCEDURE — 81002 URINALYSIS NONAUTO W/O SCOPE: CPT | Performed by: OBSTETRICS & GYNECOLOGY

## 2023-08-15 PROCEDURE — 90715 TDAP VACCINE 7 YRS/> IM: CPT | Performed by: OBSTETRICS & GYNECOLOGY

## 2023-08-15 PROCEDURE — PNV: Performed by: OBSTETRICS & GYNECOLOGY

## 2023-08-15 PROCEDURE — 90471 IMMUNIZATION ADMIN: CPT | Performed by: OBSTETRICS & GYNECOLOGY

## 2023-08-15 RX ORDER — ASPIRIN 81 MG/1
162 TABLET, CHEWABLE ORAL DAILY
COMMUNITY

## 2023-08-15 NOTE — PROGRESS NOTES
Routine Prenatal Visit  [address] [place] [address], [address], [facility], [address] [facility],[address]    Assessment/Plan:  Xiao Nugent is a 26y.o. year old  at 33w10d who presents for routine prenatal visit. 1. Advanced maternal age, primigravida, antepartum    2. Bilobed placenta    3. 28 weeks gestation of pregnancy  Assessment & Plan:  Went for lab work this morning. Orders:  -     POCT urine dip    4. Need for Tdap vaccination  -     TDAP VACCINE GREATER THAN OR EQUAL TO 8YO IM        Subjective:     CC: Prenatal care    Ella Keller is a 26 y.o.  female who presents for routine prenatal care at 28w5d. Pregnancy ROS: no leakage of fluid, pelvic pain, or vaginal bleeding. normal fetal movement. The following portions of the patient's history were reviewed and updated as appropriate: allergies, current medications, past family history, past medical history, obstetric history, gynecologic history, past social history, past surgical history and problem list.      Objective:  /60   Ht 5' 2" (1.575 m)   Wt 82.3 kg (181 lb 6.4 oz)   LMP 2023   BMI 33.18 kg/m²   Pregravid Weight/BMI: 73 kg (161 lb) (BMI 29.44)  Current Weight: 82.3 kg (181 lb 6.4 oz)   Total Weight Gain: 9.253 kg (20 lb 6.4 oz)   Pre-Hever Vitals    Flowsheet Row Most Recent Value   Prenatal Assessment    Fetal Heart Rate 140   Fundal Height (cm) 30 cm   Movement Present   Presentation Vertex   Prenatal Vitals    Blood Pressure 108/60   Weight - Scale 82.3 kg (181 lb 6.4 oz)   Urine Albumin/Glucose    Dilation/Effacement/Station    Vaginal Drainage    Edema    LLE Edema None   RLE Edema None   Facial Edema None           General: Well appearing, no distress  Respiratory: Unlabored breathing  Cardiovascular: Regular rate. Abdomen: Soft, gravid, nontender  Fundal Height: Appropriate for gestational age. Extremities: Warm and well perfused. Non tender.

## 2023-08-16 LAB
ERYTHROCYTE [DISTWIDTH] IN BLOOD BY AUTOMATED COUNT: 12.8 % (ref 11–15)
GLUCOSE 1H P 50 G GLC PO SERPL-MCNC: 105 MG/DL
HCT VFR BLD AUTO: 34.8 % (ref 35–45)
HGB BLD-MCNC: 11.9 G/DL (ref 11.7–15.5)
MCH RBC QN AUTO: 31.1 PG (ref 27–33)
MCHC RBC AUTO-ENTMCNC: 34.2 G/DL (ref 32–36)
MCV RBC AUTO: 90.9 FL (ref 80–100)
PLATELET # BLD AUTO: 262 THOUSAND/UL (ref 140–400)
PMV BLD REES-ECKER: 10 FL (ref 7.5–12.5)
RBC # BLD AUTO: 3.83 MILLION/UL (ref 3.8–5.1)
RPR SER QL: NORMAL
WBC # BLD AUTO: 10.8 THOUSAND/UL (ref 3.8–10.8)

## 2023-08-26 ENCOUNTER — CLINICAL SUPPORT (OUTPATIENT)
Age: 33
End: 2023-08-26

## 2023-08-26 DIAGNOSIS — Z32.2 ENCOUNTER FOR CHILDBIRTH INSTRUCTION: Primary | ICD-10-CM

## 2023-08-31 ENCOUNTER — ROUTINE PRENATAL (OUTPATIENT)
Dept: OBGYN CLINIC | Facility: CLINIC | Age: 33
End: 2023-08-31

## 2023-08-31 VITALS
SYSTOLIC BLOOD PRESSURE: 110 MMHG | DIASTOLIC BLOOD PRESSURE: 62 MMHG | WEIGHT: 184.8 LBS | HEIGHT: 62 IN | BODY MASS INDEX: 34.01 KG/M2

## 2023-08-31 DIAGNOSIS — Z3A.31 31 WEEKS GESTATION OF PREGNANCY: Primary | ICD-10-CM

## 2023-08-31 DIAGNOSIS — O43.199 BILOBED PLACENTA: ICD-10-CM

## 2023-08-31 DIAGNOSIS — O09.519 ADVANCED MATERNAL AGE, PRIMIGRAVIDA, ANTEPARTUM: ICD-10-CM

## 2023-08-31 LAB
SL AMB  POCT GLUCOSE, UA: NEGATIVE
SL AMB POCT URINE PROTEIN: NEGATIVE

## 2023-08-31 RX ORDER — MULTIVITAMIN WITH IRON
1 TABLET ORAL
COMMUNITY

## 2023-08-31 NOTE — PROGRESS NOTES
Routine Prenatal Visit  [address]  [address] [facility], [address], [place], [address] [facility]    Assessment/Plan:  Tayler Calderon is a 26y.o. year old  at 31w0d who presents for routine prenatal visit. 1. 31 weeks gestation of pregnancy  -     POCT urine dip    2. Advanced maternal age, primigravida, antepartum    3. Bilobed placenta    pt asking about  RSV vaccine   Instructed can receive 32-36 weeks  Approved by  FDA . Has  Growth scan scheduled for one week. Subjective:     CC: Prenatal care    Noemy Meier is a 26 y.o. Real Duane female who presents for routine prenatal care at 31w0d. Pregnancy ROS: no  leakage of fluid, pelvic pain, or vaginal bleeding.  +  fetal movement. The following portions of the patient's history were reviewed and updated as appropriate: allergies, current medications, past family history, past medical history, obstetric history, gynecologic history, past social history, past surgical history and problem list.      Objective:  /62   Ht 5' 2" (1.575 m)   Wt 83.8 kg (184 lb 12.8 oz)   LMP 2023   BMI 33.80 kg/m²   Pregravid Weight/BMI: 73 kg (161 lb) (BMI 29.44)  Current Weight: 83.8 kg (184 lb 12.8 oz)   Total Weight Gain: 10.8 kg (23 lb 12.8 oz)   Pre- Vitals    Flowsheet Row Most Recent Value   Prenatal Assessment    Movement Present   Prenatal Vitals    Blood Pressure 110/62   Weight - Scale 83.8 kg (184 lb 12.8 oz)   Urine Albumin/Glucose    Dilation/Effacement/Station    Vaginal Drainage    Edema    LLE Edema None   RLE Edema None   Facial Edema None           General: Well appearing, no distress  Respiratory: Unlabored breathing  Cardiovascular: Regular rate. Abdomen: Soft, gravid, nontender  Fundal Height: Appropriate for gestational age. Extremities: Warm and well perfused. Non tender.

## 2023-09-08 ENCOUNTER — ULTRASOUND (OUTPATIENT)
Dept: PERINATAL CARE | Facility: OTHER | Age: 33
End: 2023-09-08
Payer: COMMERCIAL

## 2023-09-08 VITALS
DIASTOLIC BLOOD PRESSURE: 70 MMHG | WEIGHT: 186.2 LBS | BODY MASS INDEX: 34.27 KG/M2 | SYSTOLIC BLOOD PRESSURE: 122 MMHG | HEIGHT: 62 IN | HEART RATE: 86 BPM

## 2023-09-08 DIAGNOSIS — Z3A.32 32 WEEKS GESTATION OF PREGNANCY: ICD-10-CM

## 2023-09-08 DIAGNOSIS — O09.513 ELDERLY PRIMIGRAVIDA, THIRD TRIMESTER: Primary | ICD-10-CM

## 2023-09-08 DIAGNOSIS — O43.199 BILOBED PLACENTA: ICD-10-CM

## 2023-09-08 PROCEDURE — 76816 OB US FOLLOW-UP PER FETUS: CPT | Performed by: OBSTETRICS & GYNECOLOGY

## 2023-09-08 NOTE — PATIENT INSTRUCTIONS
Kick Counts in Pregnancy   WHAT YOU NEED TO KNOW:   Kick counts measure how much your baby is moving in your womb. A kick from your baby can be felt as a twist, turn, swish, roll, or jab. It is common to feel your baby kicking at 26 to 28 weeks of pregnancy. You may feel your baby kick as early as 20 weeks of pregnancy. You may want to start counting at 28 weeks. DISCHARGE INSTRUCTIONS:   Contact your doctor immediately if:   You feel a change in the number of kicks or movements of your baby. You feel fewer than 10 kicks within 2 hours. You have questions or concerns about your baby's movements. Why measure kick counts:  Your baby's movement may provide information about your baby's health. He or she may move less, or not at all, if there are problems. Your baby may move less if he or she is not getting enough oxygen or nutrition from the placenta. Do not smoke while you are pregnant. Smoking decreases the amount of oxygen that gets to your baby. Talk to your healthcare provider if you need help to quit smoking. Tell your healthcare provider as soon as you feel a change in your baby's movements. When to measure kick counts:   Measure kick counts at the same time every day. Measure kick counts when your baby is awake and most active. Your baby may be most active in the evening. How to measure kick counts:  Check that your baby is awake before you measure kick counts. You can wake up your baby by lightly pushing on your belly, walking, or drinking something cold. Your healthcare provider may tell you different ways to measure kick counts. You may be told to do the following:  Use a chart or clock to keep track of the time you start and finish counting. Sit in a chair or lie on your left side. Place your hands on the largest part of your belly. Count until you reach 10 kicks. Write down how much time it takes to count 10 kicks. It may take 30 minutes to 2 hours to count 10 kicks. It should not take more than 2 hours to count 10 kicks. Follow up with your doctor as directed:  Write down your questions so you remember to ask them during your visits. © Copyright Loletta Gosselin 2022 Information is for End User's use only and may not be sold, redistributed or otherwise used for commercial purposes. The above information is an  only. It is not intended as medical advice for individual conditions or treatments. Talk to your doctor, nurse or pharmacist before following any medical regimen to see if it is safe and effective for you.

## 2023-09-08 NOTE — LETTER
September 8, 2023     Joanna Bauman MD  [address] [place] [address]  [address] [facility]    Patient: Juan Cardenas   YOB: 1990   Date of Visit: 9/8/2023       Dear Dr. Kailey Luna: Thank you for referring Juan Cardenas to me for evaluation. Below are my notes for this consultation. If you have questions, please do not hesitate to call me. I look forward to following your patient along with you.          Sincerely,        Ana Rosa Alvarenga MD        CC: No Recipients    Ana Rosa Alvarenga MD  9/7/2023  5:55 PM  Sign when Signing Visit  Please refer to the [facility] ultrasound report in Ob Procedures for additional information regarding today's visit

## 2023-09-14 PROBLEM — Z3A.33 33 WEEKS GESTATION OF PREGNANCY: Status: ACTIVE | Noted: 2023-03-28

## 2023-09-14 NOTE — PATIENT INSTRUCTIONS
- Continue prenatal vitamins and all prescribed medications. - Try to drink 64 oz of water or other non-caffeinated fluids daily.   - Fetal kick counts (to be done daily or if note a decrease in fetal movement):  in a quiet place, after a meal or drinking something sweet, lie on your side and count movements. Should get 10 movements in 2 hours. Call office if less than this. - Call office if leaking of fluid, bleeding, contractions >5-6/hour which don't decrease with rest, oral hydration, or emptying bladder, or decreased fetal movement.

## 2023-09-15 ENCOUNTER — ROUTINE PRENATAL (OUTPATIENT)
Dept: OBGYN CLINIC | Facility: CLINIC | Age: 33
End: 2023-09-15
Payer: COMMERCIAL

## 2023-09-15 VITALS — WEIGHT: 184.4 LBS | DIASTOLIC BLOOD PRESSURE: 60 MMHG | BODY MASS INDEX: 33.73 KG/M2 | SYSTOLIC BLOOD PRESSURE: 108 MMHG

## 2023-09-15 DIAGNOSIS — O43.199 BILOBED PLACENTA: ICD-10-CM

## 2023-09-15 DIAGNOSIS — O09.519 ADVANCED MATERNAL AGE, PRIMIGRAVIDA, ANTEPARTUM: Primary | ICD-10-CM

## 2023-09-15 DIAGNOSIS — Z3A.33 33 WEEKS GESTATION OF PREGNANCY: ICD-10-CM

## 2023-09-15 LAB
SL AMB  POCT GLUCOSE, UA: NEGATIVE
SL AMB POCT URINE PROTEIN: NEGATIVE

## 2023-09-15 PROCEDURE — 81002 URINALYSIS NONAUTO W/O SCOPE: CPT | Performed by: OBSTETRICS & GYNECOLOGY

## 2023-09-15 PROCEDURE — PNV: Performed by: OBSTETRICS & GYNECOLOGY

## 2023-09-15 NOTE — PROGRESS NOTES
Routine Prenatal Visit  [address]  [address] [facility], [address], [place], [address] [facility]    Assessment/Plan:  Amanda Hardwick is a 26y.o. year old  at 33w1d who presents for routine prenatal visit. 1. Advanced maternal age, primigravida, antepartum  Assessment & Plan:  32 week growth EFW 46%ile. 2. Bilobed placenta    3. 33 weeks gestation of pregnancy  -     POCT urine dip      Next OB Visit 2 weeks. Subjective:     CC: Prenatal care    Claudine Interiano is a 26 y.o.  female who presents for routine prenatal care at 33w1d. Pregnancy ROS: no leakage of fluid, pelvic pain, or vaginal bleeding. normal fetal movement. The following portions of the patient's history were reviewed and updated as appropriate: allergies, current medications, past family history, past medical history, obstetric history, gynecologic history, past social history, past surgical history and problem list.      Objective:  /60   Wt 83.6 kg (184 lb 6.4 oz)   LMP 2023   BMI 33.73 kg/m²   Pregravid Weight/BMI: 73 kg (161 lb) (BMI 29.44)  Current Weight: 83.6 kg (184 lb 6.4 oz)   Total Weight Gain: 10.6 kg (23 lb 6.4 oz)   Pre-Hever Vitals    Flowsheet Row Most Recent Value   Prenatal Assessment    Fetal Heart Rate 130   Fundal Height (cm) 33 cm   Movement Present   Presentation Vertex   Prenatal Vitals    Blood Pressure 108/60   Weight - Scale 83.6 kg (184 lb 6.4 oz)   Urine Albumin/Glucose    Dilation/Effacement/Station    Vaginal Drainage    Edema    LLE Edema None   RLE Edema None           General: Well appearing, no distress  Abdomen: Soft, gravid, nontender  Extremities: Non tender.

## 2023-09-29 ENCOUNTER — ROUTINE PRENATAL (OUTPATIENT)
Dept: OBGYN CLINIC | Facility: CLINIC | Age: 33
End: 2023-09-29
Payer: COMMERCIAL

## 2023-09-29 VITALS — BODY MASS INDEX: 34.57 KG/M2 | SYSTOLIC BLOOD PRESSURE: 118 MMHG | WEIGHT: 189 LBS | DIASTOLIC BLOOD PRESSURE: 70 MMHG

## 2023-09-29 DIAGNOSIS — Z3A.35 35 WEEKS GESTATION OF PREGNANCY: Primary | ICD-10-CM

## 2023-09-29 DIAGNOSIS — O43.199 BILOBED PLACENTA: ICD-10-CM

## 2023-09-29 LAB
SL AMB  POCT GLUCOSE, UA: NEGATIVE
SL AMB POCT URINE PROTEIN: NEGATIVE

## 2023-09-29 PROCEDURE — 81002 URINALYSIS NONAUTO W/O SCOPE: CPT | Performed by: OBSTETRICS & GYNECOLOGY

## 2023-09-29 PROCEDURE — PNV: Performed by: OBSTETRICS & GYNECOLOGY

## 2023-09-29 NOTE — PROGRESS NOTES
Routine Prenatal Visit  [address] [place] [address], [address], [facility], [address] [facility],[address]    Assessment/Plan:  Danni Eden is a 26y.o. year old  at 35w1d who presents for routine prenatal visit. 1. 35 weeks gestation of pregnancy  -     POCT urine dip    2. Bilobed placenta    + fm no utcx   Passed  And  Jelly like   Dc  No  lekaing or  Contractions since. No vaginal bleeding . Dw pt  Group b  Next visit  And consent . Subjective:     CC: Prenatal care    Abdullahi Cueva is a 26 y.o. Xiao  female who presents for routine prenatal care at 35w1d. Pregnancy ROS: no   leakage of fluid, pelvic pain, or vaginal bleeding.  +  fetal movement. The following portions of the patient's history were reviewed and updated as appropriate: allergies, current medications, past family history, past medical history, obstetric history, gynecologic history, past social history, past surgical history and problem list.      Objective:  /70 (BP Location: Left arm, Patient Position: Sitting, Cuff Size: Standard)   Wt 85.7 kg (189 lb)   LMP 2023   BMI 34.57 kg/m²   Pregravid Weight/BMI: 73 kg (161 lb) (BMI 29.44)  Current Weight: 85.7 kg (189 lb)   Total Weight Gain: 12.7 kg (28 lb)   Pre-Hever Vitals    Flowsheet Row Most Recent Value   Prenatal Assessment    Fetal Heart Rate 136-140   Fundal Height (cm) 35 cm   Movement Present   Presentation Vertex   Prenatal Vitals    Blood Pressure 118/70   Weight - Scale 85.7 kg (189 lb)   Urine Albumin/Glucose    Dilation/Effacement/Station    Vaginal Drainage    Draining Fluid No   Edema    LLE Edema None   RLE Edema None   Facial Edema None           General: Well appearing, no distress  Respiratory: Unlabored breathing  Cardiovascular: Regular rate. Abdomen: Soft, gravid, nontender  Fundal Height: Appropriate for gestational age. Extremities: Warm and well perfused. Non tender.

## 2023-09-29 NOTE — PATIENT INSTRUCTIONS
LABOR PRECAUTIONS  Call our office at [phone] for any of the following:    * I need to call immediately I if I have even a small amount of LIQUID  leaking from my vagina, with or without contractions. * I need to call if I am BLEEDING an amount equal to or more than a period. A small amount of bloody vaginal discharge is normal at the end of the pregnancy. * I need to call if I am having CONTRACTIONS  every five minutes for at least an hour. I will need a watch in order to time them. I should time them from the beginning of one contraction until the beginning of the next one. * I need to call BEFORE  I go to the hospital.   * I need to have a plan for TRANSPORTATION  to get to the hospital when I am in labor. Labor is generally not an emergency which requires an ambulance. FETAL KICK COUNTS    In the third trimester (after 28 weeks gestation) you should be performing fetal kick counts every day. Your baby should move at least 10 times in 2 hours during an active time, once a day. Choose atime of day when your baby is most active. Try to do this around the same time each day. Get into a comfortable position and then write down the time your baby first moves. Count each movement until the baby moves 10 times. These movements include kicks, punches, nudges, flutters, or rolls. This can take anywhere from 5 minutes to 2 hours. Write down the time you feel the baby's 10th movement. If 2 hours has passed and your baby has not moved at least 10 times, you should CALL THE OFFICE RIGHT AWAY. [phone]        PERINEAL / VAGINAL MASSAGE    What can I do now to decrease my chances of tearing during delivery? Massaging around the vaginal opening by you (or your partner), either antepartum (before birth) or during the second stage of labor, can reduce the likelihood of perineal tearing during childbirth.   Likewise, the use of warm packs held on the perineum during the pushing stage of labor can reduce the severity of your tear. This will happen during the pushing stage of labor. At home, you can also help reduce the chances of injury that may occur during the birth of your child through perineal massage. When should I do this? Starting around or shortly after 34 weeks of pregnancy, you or your partner should provide 5-10 minutes of vaginal massage 1-4 times per week. How? Use either almond, coconut, or olive oil and water mixture on 1 or 2 fingers (depending on comfort). Insert finger(s) 3-5cm into the vagina. Apply sweeping downward/sideward pressure from 3 to 9 o'clock for 5-10 minutes, 1-4 times per week. GROUP B STREP    Group B Strep (GBS) is a common vaginal bacteria. Approximately 25% of women normally have GBS bacteria present in the vagina. It is NOT a sexually-transmitted infection. In fact, it is not an infection AT ALL! It is just a normal vaginal bacteria for many women. However, the GBS bacteria can be dangerous to a  baby if the baby is exposed to that particular bacteria during labor and birth AND develops an infection from it. The likelihood of a  GBS infection for a woman who has GBS bacteria in the vagina is about 1%-2%. But if it does occur, a baby could become severely ill. For this reason, we do a vaginal culture (Q-tip swab of the vagina and rectum) for ALL pregnant women at approximately 36 weeks of pregnancy. If the culture shows that there is GBS bacteria present, it is NOT a reason to panic! Because in this situation we will give this woman antibiotics through her IV while she is in labor. When a mother is treated with antibiotics during labor and delivery, her baby ALMOST NEVER becomes infected with GBS bacteria.

## 2023-10-02 PROBLEM — Z3A.35 35 WEEKS GESTATION OF PREGNANCY: Status: ACTIVE | Noted: 2023-03-28

## 2023-10-04 ENCOUNTER — ROUTINE PRENATAL (OUTPATIENT)
Dept: OBGYN CLINIC | Facility: CLINIC | Age: 33
End: 2023-10-04
Payer: COMMERCIAL

## 2023-10-04 VITALS
WEIGHT: 190 LBS | HEIGHT: 62 IN | DIASTOLIC BLOOD PRESSURE: 62 MMHG | BODY MASS INDEX: 34.96 KG/M2 | SYSTOLIC BLOOD PRESSURE: 100 MMHG

## 2023-10-04 DIAGNOSIS — O09.519 ADVANCED MATERNAL AGE, PRIMIGRAVIDA, ANTEPARTUM: Primary | ICD-10-CM

## 2023-10-04 DIAGNOSIS — Z23 NEED FOR INFLUENZA VACCINATION: ICD-10-CM

## 2023-10-04 DIAGNOSIS — O43.199 BILOBED PLACENTA: ICD-10-CM

## 2023-10-04 DIAGNOSIS — Z3A.35 35 WEEKS GESTATION OF PREGNANCY: ICD-10-CM

## 2023-10-04 LAB
SL AMB  POCT GLUCOSE, UA: NEGATIVE
SL AMB POCT URINE PROTEIN: NEGATIVE

## 2023-10-04 PROCEDURE — 90686 IIV4 VACC NO PRSV 0.5 ML IM: CPT | Performed by: OBSTETRICS & GYNECOLOGY

## 2023-10-04 PROCEDURE — 81002 URINALYSIS NONAUTO W/O SCOPE: CPT | Performed by: OBSTETRICS & GYNECOLOGY

## 2023-10-04 PROCEDURE — PNV: Performed by: OBSTETRICS & GYNECOLOGY

## 2023-10-04 PROCEDURE — 90471 IMMUNIZATION ADMIN: CPT | Performed by: OBSTETRICS & GYNECOLOGY

## 2023-10-04 NOTE — ASSESSMENT & PLAN NOTE
Discussed GBS testing next week. Pt wishes flu vaccine - which we can give today. Recom Covid booster which is now new and available at local pharmacies. Pt asking about RSV vaccine - we do not have but if she can get it at a pharmacy then that is recommended. Reviewed carpal tunnel splints and compression stockings for swelling in extremities. [facility], Division of Infectious Diseases  MERLE Escamilla, HANH Villegas  [phone]  ([phone] - weekdays after 5pm and weekends)    DAY, DEV  62y, Female  [id number]    HPI:  Patient is a 62 year old female with PMH of HIV (on Biktarvy, undetectable viral load), right breast cancer in remission, HTN, and asthma who presented with fatigue and poor appetite. Patient stated she started feeling weak about a week ago and called her cardiologist, she was seen yesterday and complained of feeling ill for the past few days. She reported there that she was feeling weak and tired, nonproductive cough, abdominal discomfort and diarrhea for the past few days as well. She reported no fevers but felt some dizziness with standing, had few near syncopal episodes. She went to an urgent care twice and tested COVID-19 negative.. She saw her PMD, also was complaining of poor oral intake. PMD noted patient was responsive but lethargic, no syncope.  In the ER, patient tested +COVID-19 PCR. CXR with left base atelectasis. CTAP with patchy LLL ground glass opacity and minimal RLL ground glass opacity. She was given IV ceftriaxone, azithromycin, 1L LR, tylenol 975mg PO, maalox, pepcid 20mg, zofran 4mg IV, KCl 40meq. Admitted for COVID-19 pneumonia.   ROS: 14 point review of systems completed, pertinent positives and negatives as per HPI.    Allergies: No Known Allergies    PMH -- Breast cancer right, in 2015, s/p chemo  Essential hypertension  Asthma  HIV disease x40 years, on biktarvy    PSH -- s/p right lumpectomy 2015 with radiation and chemo, hysterectomy with b/l oophorectomy 2011, umbilical hernia repair, right inguinal mass removal 2011  FH -- No pertinent family history in first degree relatives  Social History -- denies tobacco, alcohol or illicit drug use    Physical Exam--  Vital Signs Last 24 Hrs  T(F): 98.4 (22 Jan 2021 05:02), Max: 101 (21 Jan 2021 18:39)  HR: 79 (22 Jan 2021 05:02) (79 - 100)  BP: 102/66 (22 Jan 2021 05:02) (95/62 - 110/58)  RR: 18 (22 Jan 2021 05:02) (15 - 18)  SpO2: 96% (22 Jan 2021 05:02) (94% - 100%)  General: no acute distress, obese  HEENT: NC/AT, EOMI, anicteric, conjunctiva pink and moist, neck supple  Lungs: Clear bilaterally without rales, wheezing or rhonchi  Heart: Regular rate and rhythm. No murmur, rub or gallop.  Abdomen: Soft. Nondistended. Nontender. BS present.   Neuro: AAOx3, no obvious focal deficits   Extremities: No cyanosis. No edema.   Skin: Warm. Dry. Good turgor. No rash.   Psychiatric: Appropriate affect and mood for situation.   Lines: PIV    Laboratory & Imaging Data--  CBC:                       8.8    5.49  )-----------( 339      ( 22 Jan 2021 05:29 )             28.3     CMP: 01-22    140  |  105  |  14  ----------------------------<  93  4.0   |  25  |  1.10    Ca    8.5      22 Jan 2021 05:29  Mg     2.3     01-22    TPro  6.6  /  Alb  3.3  /  TBili  0.8  /  DBili  x   /  AST  22  /  ALT  15  /  AlkPhos  56  01-22    LIVER FUNCTIONS - ( 22 Jan 2021 05:29 )  Alb: 3.3 g/dL / Pro: 6.6 g/dL / ALK PHOS: 56 U/L / ALT: 15 U/L / AST: 22 U/L / GGT: x           Microbiology:   1/21 - COVID-19 PCR - Positive    Radiology--  CT Abdomen and Pelvis w/ IV Cont (01.21.21 @ 20:22) > IMPRESSION: Likely left lower lobe pneumonia. Follow-up to resolution is advised. No acute abdominal or pelvic pathology.  Xray Chest 1 View- PORTABLE-Urgent (Xray Chest 1 View- PORTABLE-Urgent .) (01.21.21 @ 18:33) > IMPRESSION: Left base atelectasis.    Active Medications--  acetaminophen   Tablet .. 650 milliGRAM(s) Oral every 4 hours PRN  ALBUTerol    90 MICROgram(s) HFA Inhaler 2 Puff(s) Inhalation every 4 hours PRN  cefTRIAXone   IVPB 1000 milliGRAM(s) IV Intermittent every 24 hours  dexAMETHasone  Injectable 6 milliGRAM(s) IV Push daily  guaifenesin/dextromethorphan  Syrup 10 milliLiter(s) Oral every 4 hours PRN  heparin   Injectable 7500 Unit(s) SubCutaneous every 8 hours  lactated ringers. 1000 milliLiter(s) IV Continuous <Continuous>    Antimicrobials:   cefTRIAXone   IVPB 1000 milliGRAM(s) IV Intermittent every 24 hours - started 1/21  s/p azithromycin 1/21   [facility], Division of Infectious Diseases  MERLE Escamilla, HANH Villegas  [phone]  ([phone] - weekdays after 5pm and weekends)    DAY, DEV  62y, Female  [phone]    HPI:  Patient is a 62 year old female with PMH of HIV (on Biktarvy, undetectable viral load), right breast cancer in remission, HTN, and asthma who presented with fatigue and poor appetite. Patient stated she started feeling weak about a week ago and called her cardiologist, she was seen yesterday and complained of feeling ill for the past few days. She reported there that she was feeling weak and tired, nonproductive cough, abdominal discomfort and diarrhea for the past few days as well. She reported no fevers but felt some dizziness with standing, had few near syncopal episodes. She went to an urgent care twice and tested COVID-19 negative.. She saw her PMD, also was complaining of poor oral intake. PMD noted patient was responsive but lethargic, no syncope. Outpatient chart reviewed.   In the ER, patient tested +COVID-19 PCR. CXR with left base atelectasis. CTAP with patchy LLL ground glass opacity and minimal RLL ground glass opacity. She was given IV ceftriaxone, azithromycin, 1L LR, tylenol 975mg PO, maalox, pepcid 20mg, zofran 4mg IV, KCl 40meq. Admitted for COVID-19 pneumonia.   ROS: 14 point review of systems completed, pertinent positives and negatives as per HPI.    Allergies: No Known Allergies    PMH -- Breast cancer right, in 2015, s/p chemo  Essential hypertension  Asthma  HIV disease x40 years, was on Odefsey, changed to Biktarvy in 2019   PSH -- s/p right lumpectomy 2015 with radiation and chemo, hysterectomy with b/l oophorectomy 2011, umbilical hernia repair, right inguinal mass removal 2011  FH -- No pertinent family history in first degree relatives  Social History -- denies tobacco, alcohol or illicit drug use    Physical Exam--  Vital Signs Last 24 Hrs  T(F): 98.4 (22 Jan 2021 05:02), Max: 101 (21 Jan 2021 18:39)  HR: 79 (22 Jan 2021 05:02) (79 - 100)  BP: 102/66 (22 Jan 2021 05:02) (95/62 - 110/58)  RR: 18 (22 Jan 2021 05:02) (15 - 18)  SpO2: 96% (22 Jan 2021 05:02) (94% - 100%)  General: no acute distress, obese  HEENT: NC/AT, EOMI, anicteric, conjunctiva pink and moist, neck supple  Lungs: Clear bilaterally without rales, wheezing or rhonchi  Heart: Regular rate and rhythm. No murmur, rub or gallop.  Abdomen: Soft. Nondistended. Nontender. BS present.   Neuro: AAOx3, no obvious focal deficits   Extremities: No cyanosis. No edema.   Skin: Warm. Dry. Good turgor. No rash.   Psychiatric: Appropriate affect and mood for situation.   Lines: PIV    Laboratory & Imaging Data--  CBC:                       8.8    5.49  )-----------( 339      ( 22 Jan 2021 05:29 )             28.3     CMP: 01-22    140  |  105  |  14  ----------------------------<  93  4.0   |  25  |  1.10    Ca    8.5      22 Jan 2021 05:29  Mg     2.3     01-22    TPro  6.6  /  Alb  3.3  /  TBili  0.8  /  DBili  x   /  AST  22  /  ALT  15  /  AlkPhos  56  01-22    LIVER FUNCTIONS - ( 22 Jan 2021 05:29 )  Alb: 3.3 g/dL / Pro: 6.6 g/dL / ALK PHOS: 56 U/L / ALT: 15 U/L / AST: 22 U/L / GGT: x           Microbiology:   1/21 - COVID-19 PCR - Positive    Radiology--  CT Abdomen and Pelvis w/ IV Cont (01.21.21 @ 20:22) > IMPRESSION: Likely left lower lobe pneumonia. Follow-up to resolution is advised. No acute abdominal or pelvic pathology.  Xray Chest 1 View- PORTABLE-Urgent (Xray Chest 1 View- PORTABLE-Urgent .) (01.21.21 @ 18:33) > IMPRESSION: Left base atelectasis.    Active Medications--  acetaminophen   Tablet .. 650 milliGRAM(s) Oral every 4 hours PRN  ALBUTerol    90 MICROgram(s) HFA Inhaler 2 Puff(s) Inhalation every 4 hours PRN  cefTRIAXone   IVPB 1000 milliGRAM(s) IV Intermittent every 24 hours  dexAMETHasone  Injectable 6 milliGRAM(s) IV Push daily  guaifenesin/dextromethorphan  Syrup 10 milliLiter(s) Oral every 4 hours PRN  heparin   Injectable 7500 Unit(s) SubCutaneous every 8 hours  lactated ringers. 1000 milliLiter(s) IV Continuous <Continuous>    Antimicrobials:   cefTRIAXone   IVPB 1000 milliGRAM(s) IV Intermittent every 24 hours - started 1/21  s/p azithromycin 1/21

## 2023-10-04 NOTE — PROGRESS NOTES
Routine Prenatal Visit  [address]  [address] [facility], [address], [place], [address] [facility]    Assessment/Plan:  All Gee is a 26y.o. year old  at 34w5d who presents for routine prenatal visit. 1. Advanced maternal age, primigravida, antepartum  Assessment & Plan:  Stop ASA after 36 weeks. 2. Bilobed placenta  Assessment & Plan:  Growth u/s normal 32 weeks. No further u/s necessary. 3. Need for influenza vaccination  -     influenza vaccine, quadrivalent, 0.5 mL, preservative-free, for adult and pediatric patients 6 mos+ (AFLURIA, FLUARIX, FLULAVAL, FLUZONE)    4. 35 weeks gestation of pregnancy  Assessment & Plan:  Discussed GBS testing next week. Pt wishes flu vaccine - which we can give today. Recom Covid booster which is now new and available at local pharmacies. Pt asking about RSV vaccine - we do not have but if she can get it at a pharmacy then that is recommended. Reviewed carpal tunnel splints and compression stockings for swelling in extremities. Orders:  -     POCT urine dip      Next OB Visit 1 weeks. Subjective:     CC: Prenatal care    Patrice Michel is a 26 y.o.  female who presents for routine prenatal care at 35w6d. Pregnancy ROS: no leakage of fluid, pelvic pain, or vaginal bleeding. normal fetal movement.     The following portions of the patient's history were reviewed and updated as appropriate: allergies, current medications, past family history, past medical history, obstetric history, gynecologic history, past social history, past surgical history and problem list.      Objective:  /62   Ht 5' 2" (1.575 m)   Wt 86.2 kg (190 lb)   LMP 2023   BMI 34.75 kg/m²   Pregravid Weight/BMI: 73 kg (161 lb) (BMI 29.44)  Current Weight: 86.2 kg (190 lb)   Total Weight Gain: 13.2 kg (29 lb)   Pre-Hever Vitals    Flowsheet Row Most Recent Value   Prenatal Assessment    Fetal Heart Rate 40   Fundal Height (cm) 36 cm   Movement Present   Presentation Vertex   Prenatal Vitals    Blood Pressure 100/62   Weight - Scale 86.2 kg (190 lb)   Urine Albumin/Glucose    Dilation/Effacement/Station    Vaginal Drainage    Edema    LLE Edema Trace   RLE Edema Trace   Facial Edema None           General: Well appearing, no distress  Abdomen: Soft, gravid, nontender  Extremities: Non tender.

## 2023-10-06 ENCOUNTER — HOSPITAL ENCOUNTER (INPATIENT)
Facility: HOSPITAL | Age: 33
LOS: 2 days | Discharge: HOME | End: 2023-10-08
Attending: OBSTETRICS & GYNECOLOGY | Admitting: OBSTETRICS & GYNECOLOGY
Payer: COMMERCIAL

## 2023-10-06 ENCOUNTER — ANESTHESIA EVENT (INPATIENT)
Dept: ANESTHESIOLOGY | Facility: HOSPITAL | Age: 33
End: 2023-10-06
Payer: COMMERCIAL

## 2023-10-06 ENCOUNTER — ANESTHESIA (INPATIENT)
Dept: ANESTHESIOLOGY | Facility: HOSPITAL | Age: 33
End: 2023-10-06
Payer: COMMERCIAL

## 2023-10-06 LAB
ABO GROUP BLD: NORMAL
BASE EXCESS BLDCOA CALC-SCNC: -3.1 MMOL/L (ref 3–11)
BASE EXCESS BLDCOV CALC-SCNC: -4.2 MMOL/L (ref 1–9)
BLD GP AB SCN SERPL QL: NEGATIVE
ERYTHROCYTE [DISTWIDTH] IN BLOOD BY AUTOMATED COUNT: 13 % (ref 11.6–15.1)
HCO3 BLDCOA-SCNC: 21.6 MMOL/L (ref 17.3–27.3)
HCO3 BLDCOV-SCNC: 19.8 MMOL/L (ref 12.2–28.6)
HCT VFR BLD AUTO: 36.4 % (ref 34.8–46.1)
HGB BLD-MCNC: 12.1 G/DL (ref 11.5–15.4)
HOLD SPECIMEN: NORMAL
MCH RBC QN AUTO: 29.3 PG (ref 26.8–34.3)
MCHC RBC AUTO-ENTMCNC: 33.2 G/DL (ref 31.4–37.4)
MCV RBC AUTO: 88 FL (ref 82–98)
O2 CT VFR BLDCOA CALC: 8.8 ML/DL
OXYHGB MFR BLDCOA: 38.5 %
OXYHGB MFR BLDCOV: 48.9 %
PCO2 BLDCOA: 38.2 MM[HG] (ref 30–60)
PCO2 BLDCOV: 34 MM HG (ref 27–43)
PH BLDCOA: 7.37 [PH] (ref 7.23–7.43)
PH BLDCOV: 7.38 [PH] (ref 7.19–7.49)
PLATELET # BLD AUTO: 270 THOUSANDS/UL (ref 149–390)
PMV BLD AUTO: 10.2 FL (ref 8.9–12.7)
PO2 BLDCOA: 17.7 MM HG (ref 5–25)
PO2 BLDCOV: 19.9 MM HG (ref 15–45)
RBC # BLD AUTO: 4.13 MILLION/UL (ref 3.81–5.12)
RH BLD: POSITIVE
SAO2 % BLDCOV: 11.5 ML/DL
SPECIMEN EXPIRATION DATE: NORMAL
TREPONEMA PALLIDUM IGG+IGM AB [PRESENCE] IN SERUM OR PLASMA BY IMMUNOASSAY: NORMAL
WBC # BLD AUTO: 15.55 THOUSAND/UL (ref 4.31–10.16)

## 2023-10-06 PROCEDURE — 82805 BLOOD GASES W/O2 SATURATION: CPT | Performed by: OBSTETRICS & GYNECOLOGY

## 2023-10-06 PROCEDURE — 85027 COMPLETE CBC AUTOMATED: CPT | Performed by: OBSTETRICS & GYNECOLOGY

## 2023-10-06 PROCEDURE — 86780 TREPONEMA PALLIDUM: CPT | Performed by: OBSTETRICS & GYNECOLOGY

## 2023-10-06 PROCEDURE — G0463 HOSPITAL OUTPT CLINIC VISIT: HCPCS

## 2023-10-06 PROCEDURE — 86901 BLOOD TYPING SEROLOGIC RH(D): CPT | Performed by: OBSTETRICS & GYNECOLOGY

## 2023-10-06 PROCEDURE — 59400 OBSTETRICAL CARE: CPT | Performed by: OBSTETRICS & GYNECOLOGY

## 2023-10-06 PROCEDURE — 86850 RBC ANTIBODY SCREEN: CPT | Performed by: OBSTETRICS & GYNECOLOGY

## 2023-10-06 PROCEDURE — NC001 PR NO CHARGE: Performed by: OBSTETRICS & GYNECOLOGY

## 2023-10-06 PROCEDURE — 99212 OFFICE O/P EST SF 10 MIN: CPT

## 2023-10-06 PROCEDURE — 88307 TISSUE EXAM BY PATHOLOGIST: CPT | Performed by: PATHOLOGY

## 2023-10-06 PROCEDURE — 0KQM0ZZ REPAIR PERINEUM MUSCLE, OPEN APPROACH: ICD-10-PCS | Performed by: OBSTETRICS & GYNECOLOGY

## 2023-10-06 PROCEDURE — 86900 BLOOD TYPING SEROLOGIC ABO: CPT | Performed by: OBSTETRICS & GYNECOLOGY

## 2023-10-06 RX ORDER — ONDANSETRON 2 MG/ML
4 INJECTION INTRAMUSCULAR; INTRAVENOUS EVERY 8 HOURS PRN
Status: DISCONTINUED | OUTPATIENT
Start: 2023-10-06 | End: 2023-10-08 | Stop reason: HOSPADM

## 2023-10-06 RX ORDER — BUPIVACAINE HYDROCHLORIDE 2.5 MG/ML
30 INJECTION, SOLUTION EPIDURAL; INFILTRATION; INTRACAUDAL ONCE AS NEEDED
Status: DISCONTINUED | OUTPATIENT
Start: 2023-10-06 | End: 2023-10-06

## 2023-10-06 RX ORDER — ACETAMINOPHEN 325 MG/1
650 TABLET ORAL EVERY 6 HOURS PRN
Status: DISCONTINUED | OUTPATIENT
Start: 2023-10-06 | End: 2023-10-08 | Stop reason: HOSPADM

## 2023-10-06 RX ORDER — LIDOCAINE HYDROCHLORIDE AND EPINEPHRINE 15; 5 MG/ML; UG/ML
INJECTION, SOLUTION EPIDURAL AS NEEDED
Status: DISCONTINUED | OUTPATIENT
Start: 2023-10-06 | End: 2023-10-08 | Stop reason: HOSPADM

## 2023-10-06 RX ORDER — DOCUSATE SODIUM 100 MG/1
100 CAPSULE, LIQUID FILLED ORAL 2 TIMES DAILY
Status: DISCONTINUED | OUTPATIENT
Start: 2023-10-06 | End: 2023-10-08 | Stop reason: HOSPADM

## 2023-10-06 RX ORDER — SODIUM CHLORIDE, SODIUM LACTATE, POTASSIUM CHLORIDE, CALCIUM CHLORIDE 600; 310; 30; 20 MG/100ML; MG/100ML; MG/100ML; MG/100ML
125 INJECTION, SOLUTION INTRAVENOUS CONTINUOUS
Status: DISCONTINUED | OUTPATIENT
Start: 2023-10-06 | End: 2023-10-08 | Stop reason: HOSPADM

## 2023-10-06 RX ORDER — DIAPER,BRIEF,INFANT-TODD,DISP
1 EACH MISCELLANEOUS DAILY PRN
Status: DISCONTINUED | OUTPATIENT
Start: 2023-10-06 | End: 2023-10-08 | Stop reason: HOSPADM

## 2023-10-06 RX ORDER — ROPIVACAINE HYDROCHLORIDE 2 MG/ML
INJECTION, SOLUTION EPIDURAL; INFILTRATION; PERINEURAL CONTINUOUS PRN
Status: DISCONTINUED | OUTPATIENT
Start: 2023-10-06 | End: 2023-10-08 | Stop reason: HOSPADM

## 2023-10-06 RX ORDER — CALCIUM CARBONATE 500 MG/1
1000 TABLET, CHEWABLE ORAL DAILY PRN
Status: DISCONTINUED | OUTPATIENT
Start: 2023-10-06 | End: 2023-10-08 | Stop reason: HOSPADM

## 2023-10-06 RX ORDER — BUPIVACAINE HYDROCHLORIDE 2.5 MG/ML
INJECTION, SOLUTION EPIDURAL; INFILTRATION; INTRACAUDAL AS NEEDED
Status: DISCONTINUED | OUTPATIENT
Start: 2023-10-06 | End: 2023-10-08 | Stop reason: HOSPADM

## 2023-10-06 RX ORDER — OXYTOCIN/RINGER'S LACTATE 30/500 ML
PLASTIC BAG, INJECTION (ML) INTRAVENOUS
Status: DISPENSED
Start: 2023-10-06 | End: 2023-10-06

## 2023-10-06 RX ORDER — LIDOCAINE HYDROCHLORIDE 10 MG/ML
INJECTION, SOLUTION EPIDURAL; INFILTRATION; INTRACAUDAL; PERINEURAL AS NEEDED
Status: DISCONTINUED | OUTPATIENT
Start: 2023-10-06 | End: 2023-10-08 | Stop reason: HOSPADM

## 2023-10-06 RX ORDER — NALBUPHINE HYDROCHLORIDE 10 MG/ML
5 INJECTION, SOLUTION INTRAMUSCULAR; INTRAVENOUS; SUBCUTANEOUS
Status: DISCONTINUED | OUTPATIENT
Start: 2023-10-06 | End: 2023-10-06

## 2023-10-06 RX ORDER — IBUPROFEN 600 MG/1
600 TABLET ORAL EVERY 6 HOURS PRN
Status: DISCONTINUED | OUTPATIENT
Start: 2023-10-06 | End: 2023-10-08 | Stop reason: HOSPADM

## 2023-10-06 RX ORDER — OXYTOCIN/RINGER'S LACTATE 30/500 ML
PLASTIC BAG, INJECTION (ML) INTRAVENOUS
Status: DISCONTINUED
Start: 2023-10-06 | End: 2023-10-06 | Stop reason: WASHOUT

## 2023-10-06 RX ORDER — ONDANSETRON 2 MG/ML
4 INJECTION INTRAMUSCULAR; INTRAVENOUS EVERY 6 HOURS PRN
Status: DISCONTINUED | OUTPATIENT
Start: 2023-10-06 | End: 2023-10-06

## 2023-10-06 RX ORDER — BUTORPHANOL TARTRATE 1 MG/ML
1 INJECTION, SOLUTION INTRAMUSCULAR; INTRAVENOUS
Status: DISCONTINUED | OUTPATIENT
Start: 2023-10-06 | End: 2023-10-06 | Stop reason: RX

## 2023-10-06 RX ORDER — DOCUSATE SODIUM 100 MG/1
100 CAPSULE, LIQUID FILLED ORAL DAILY
Status: DISCONTINUED | OUTPATIENT
Start: 2023-10-06 | End: 2023-10-07 | Stop reason: ALTCHOICE

## 2023-10-06 RX ORDER — DIPHENHYDRAMINE HYDROCHLORIDE 50 MG/ML
25 INJECTION INTRAMUSCULAR; INTRAVENOUS EVERY 6 HOURS PRN
Status: DISCONTINUED | OUTPATIENT
Start: 2023-10-06 | End: 2023-10-08 | Stop reason: HOSPADM

## 2023-10-06 RX ORDER — LORATADINE 10 MG/1
10 TABLET ORAL DAILY PRN
Status: DISCONTINUED | OUTPATIENT
Start: 2023-10-06 | End: 2023-10-08 | Stop reason: HOSPADM

## 2023-10-06 RX ADMIN — BENZOCAINE AND LEVOMENTHOL 1 APPLICATION: 200; 5 SPRAY TOPICAL at 14:51

## 2023-10-06 RX ADMIN — LIDOCAINE HYDROCHLORIDE 3 ML: 10 INJECTION, SOLUTION EPIDURAL; INFILTRATION; INTRACAUDAL; PERINEURAL at 08:47

## 2023-10-06 RX ADMIN — IBUPROFEN 600 MG: 600 TABLET ORAL at 14:53

## 2023-10-06 RX ADMIN — SODIUM CHLORIDE 2.5 MILLION UNITS: 9 INJECTION, SOLUTION INTRAVENOUS at 11:20

## 2023-10-06 RX ADMIN — ACETAMINOPHEN 650 MG: 325 TABLET, FILM COATED ORAL at 19:14

## 2023-10-06 RX ADMIN — SODIUM CHLORIDE, SODIUM LACTATE, POTASSIUM CHLORIDE, AND CALCIUM CHLORIDE 125 ML/HR: .6; .31; .03; .02 INJECTION, SOLUTION INTRAVENOUS at 09:17

## 2023-10-06 RX ADMIN — WITCH HAZEL 1 PAD: 500 SOLUTION RECTAL; TOPICAL at 14:51

## 2023-10-06 RX ADMIN — ONDANSETRON 4 MG: 2 INJECTION INTRAMUSCULAR; INTRAVENOUS at 08:43

## 2023-10-06 RX ADMIN — BUPIVACAINE HYDROCHLORIDE 5 ML: 2.5 INJECTION, SOLUTION EPIDURAL; INFILTRATION; INTRACAUDAL; PERINEURAL at 08:49

## 2023-10-06 RX ADMIN — SODIUM CHLORIDE 5 MILLION UNITS: 0.9 INJECTION, SOLUTION INTRAVENOUS at 08:23

## 2023-10-06 RX ADMIN — SODIUM CHLORIDE, SODIUM LACTATE, POTASSIUM CHLORIDE, AND CALCIUM CHLORIDE 999 ML/HR: .6; .31; .03; .02 INJECTION, SOLUTION INTRAVENOUS at 08:20

## 2023-10-06 RX ADMIN — ROPIVACAINE HYDROCHLORIDE: 2 INJECTION, SOLUTION EPIDURAL; INFILTRATION at 09:01

## 2023-10-06 RX ADMIN — BUPIVACAINE HYDROCHLORIDE 3 ML: 2.5 INJECTION, SOLUTION EPIDURAL; INFILTRATION; INTRACAUDAL; PERINEURAL at 08:54

## 2023-10-06 RX ADMIN — LIDOCAINE HYDROCHLORIDE,EPINEPHRINE BITARTRATE 3 ML: 15; .005 INJECTION, SOLUTION EPIDURAL; INFILTRATION; INTRACAUDAL; PERINEURAL at 08:51

## 2023-10-06 RX ADMIN — DOCUSATE SODIUM 100 MG: 100 CAPSULE, LIQUID FILLED ORAL at 18:04

## 2023-10-06 RX ADMIN — ROPIVACAINE HYDROCHLORIDE 12 ML/HR: 2 INJECTION, SOLUTION EPIDURAL; INFILTRATION at 09:00

## 2023-10-06 NOTE — ANESTHESIA PROCEDURE NOTES
Epidural Block    Patient location during procedure: OB/L&D  Start time: 10/6/2023 8:50 AM  Reason for block: procedure for pain and at surgeon's request  Staffing  Performed by: Marc Carrillo DO  Authorized by: Marc Carrillo DO    Preanesthetic Checklist  Completed: patient identified, IV checked, site marked, risks and benefits discussed, surgical consent, monitors and equipment checked, pre-op evaluation and timeout performed  Epidural  Patient position: sitting  Prep: ChloraPrep  Sedation Level: no sedation  Patient monitoring: continuous pulse oximetry, frequent blood pressure checks and heart rate  Approach: midline  Location: lumbar, L3-4  Injection technique: NIYAH air  Needle  Needle type: Tuohy   Needle gauge: 17 G  Needle insertion depth: 6 cm  Catheter type: side hole  Catheter size: 19 G  Catheter at skin depth: 10 cm  Catheter securement method: tape  Test dose: negative  Assessment  Sensory level: T10  Number of attempts: 1negative aspiration for CSF, negative aspiration for heme and no paresthesia on injection  patient tolerated the procedure well with no immediate complications

## 2023-10-06 NOTE — H&P
History & Physical - OB/GYN   Enoch Fernandez 26 y.o. female MRN: [id number]  Unit/Bed#:  TRIAGE 1-01 Encounter: [id number]    Assessment:  26 y.o. Sheridan Jasmine at 36w1d admitted for PPROM     Plan:  1. Admit to L&D  2. Place IV, initiate IV fluids  3. Labs: CBC, RPR, type and screen  4. Labor management: expectant management and antibiotics for GBS prophylaxis  5. Analgesia/Epidural PRN  _____________________    Chief complaint: I think my water broke and I am having contractions    She is a patient of [address] HPI: Enoch Fernandez is 26 y.o. Sheridan Jasmine at 36w1d presenting for PPROM    Contractions:  yes  Fetal movement:  yes  Vaginal bleeding:   no  Leaking of fluid:  yes      Pregnancy Complications:  P1J4 Problems (from 03/28/23 to present)       Problem Noted Resolved    Bilobed placenta 7/18/2023 by Julian Paredes MD No    Overview Addendum 9/14/2023  7:06 PM by Maria Esther Grijalva MD     Suspected @ 20 week U/S.  Follow up growth scheduled    32 week f/u growth - 1945 grams - 4 lbs 5 oz    (46%)    [  ] placenta to be examined post delivery to make sure both lobes delivered         Advanced maternal age, primigravida, antepartum 3/28/2023 by Maria Esther Grijalva MD No    Overview Signed 3/28/2023 12:52 PM by Maria Esther Grijalva MD     32yo at [facility]    Start ASA 162mg 12-36 weeks         35 weeks gestation of pregnancy 3/28/2023 by Maria Esther Grijalva MD No    Overview Addendum 10/4/2023  9:05 AM by Maria Esther Grijalva MD     Covid vaccine Pfizer 4/2021 course with 1 booster, has not had bivalent booster as of 3/28/2023 - encouraged to consider    TDAP - 8/15/23  Flu vaccine - 10/4/2023                 PMH:  Past Medical History:   Diagnosis Date   • ADHD     managed by PCP, On Adderall until 1/2023   • Migraine 1/1/2006    managed with OTC medications   • Seasonal allergies        PSH:  Past Surgical History:   Procedure Laterality Date   • OVARIAN CYST REMOVAL Right 2008   • ROTATOR CUFF REPAIR Right    • WISDOM TOOTH EXTRACTION         Social Hx:  Social History     Tobacco Use   • Smoking status: Never   • Smokeless tobacco: Never   Vaping Use   • Vaping Use: Never used   Substance Use Topics   • Alcohol use: Not Currently     Comment: No alcohol since 2023, previously averaged 2-3 drinks/wk   • Drug use: Never        OB Hx:  OB History    Para Term  AB Living   1 0 0 0 0 0   SAB IAB Ectopic Multiple Live Births   0 0 0 0 0      # Outcome Date GA Lbr Chu/2nd Weight Sex Delivery Anes PTL Lv   1 Current                Meds:  No current facility-administered medications on file prior to encounter. Current Outpatient Medications on File Prior to Encounter   Medication Sig Dispense Refill   • cetirizine (ZyrTEC) 10 mg tablet Take 10 mg by mouth if needed for allergies     • docusate sodium (COLACE) 100 mg capsule Take 100 mg by mouth if needed     • Magnesium 250 MG TABS Take 1 tablet by mouth Daily at 2am     • Prenatal MV & Min w/FA-DHA (ONE A DAY PRENATAL PO) Take by mouth     • [DISCONTINUED] aspirin 81 mg chewable tablet Chew 162 mg daily         Allergies:  No Known Allergies    Labs:  ABO Grouping   Date Value Ref Range Status   2023 A  Final      Rh Type   Date Value Ref Range Status   2023 RH(D) POSITIVE  Final     Comment:        For additional information, please refer to   http://[email or web address] [email or web address]/faq/[email or web address]   (This link is being provided for informational/  educational purposes only.)        Rh Type   Date Value Ref Range Status   2023 RH(D) POSITIVE  Final     Comment:        For additional information, please refer to   http://[email or web address] [email or web address]/VSV052   (This link is being provided for informational/  educational purposes only.)       HIV-1/HIV-2 AB   Date Value Ref Range Status   2023 Non-Reactive  Final     HIV AG/AB, 4th Gen   Date Value Ref Range Status   2023 NON-REACTIVE NON-REACTIVE Final Comment:     HIV-1 antigen and HIV-1/HIV-2 antibodies were not  detected. There is no laboratory evidence of HIV  infection. PLEASE NOTE: This information has been disclosed to  you from records whose confidentiality may be  protected by state law. If your state requires such  protection, then the state law prohibits you from  making any further disclosure of the information  without the specific written consent of the person  to whom it pertains, or as otherwise permitted by law. A general authorization for the release of medical or  other information is NOT sufficient for this purpose. For additional information please refer to  http://education. [employer]-you/faq/VMO157  (This link is being provided for informational/  educational purposes only.)        The performance of this assay has not been clinically  validated in patients less than 3years old.           Hepatitis B Surface Ag   Date Value Ref Range Status   03/31/2023 Non-Reactive  Final     HEP C AB   Date Value Ref Range Status   03/31/2023 NON-REACTIVE NON-REACTIVE Final     RPR   Date Value Ref Range Status   08/15/2023 NON-REACTIVE NON-REACTIVE Final     No results found for: "RUBELLAIGGQT"  Glucose   Date Value Ref Range Status   08/15/2023 105 <135 mg/dL Final     No results found for: "Ardath Oskar"  No results found for: "STREPGRPB"    Physical Exam:  /62 (BP Location: Right arm)   Pulse 84   Temp (!) 97.2 °F (36.2 °C) (Temporal)   Resp 18   LMP 01/26/2023   SpO2 100%     Gen: alert, no acute distress  Cardiac: regular rate  Resp: unlabored breathing  Abdomen: gravid, non-tender, non-distended  Extremities: non-tender, no edema    Estimated Fetal Weight: 6 lbs  Presentation: cephalic    SVE:   2/71/-9    Pelvis assessed and felt to be adequate    FHT:  120's, +accelerations, no decelerations, mof variability     TOCO:  CTX q 2 minutes     Membranes: grossly ruptured, clear fluid      Naty Trujillo DO  10/6/2023 7:55 AM

## 2023-10-06 NOTE — OB LABOR/OXYTOCIN SAFETY PROGRESS
Labor Progress Note - Monik Nguyen 26 y.o. female MRN: [id number]    Unit/Bed#: -01 Encounter: [id number]         Cervical Dilation: Lip/rim (Comment)        Cervical Effacement: 100  Fetal Station: -1     Fetal Heart Rate: 132 BPM  FHR Category: I     Vital Signs:   Vitals:    10/06/23 0724   BP: 133/62   Pulse: 84   Resp: 18   Temp: (!) 97.2 °F (36.2 °C)   SpO2: 100%       Notes/comments:  Great progress.   Will check in 1 hour, unless patient feels pelvic pressure        Gema Brito DO 10/6/2023 10:21 AM

## 2023-10-06 NOTE — ANESTHESIA PREPROCEDURE EVALUATION
Procedure:  LABOR ANALGESIA    Relevant Problems   CARDIO   (+) Migraine without aura   (+) Migraine without aura and without status migrainosus, not intractable      GYN   (+) 35 weeks gestation of pregnancy      NEURO/PSYCH   (+) Migraine without aura   (+) Migraine without aura and without status migrainosus, not intractable      Other   (+) ADHD (attention deficit hyperactivity disorder)   (+) Bilobed placenta        Physical Exam    Airway    Mallampati score: II  TM Distance: >3 FB  Neck ROM: full     Dental   No notable dental hx     Cardiovascular      Pulmonary      Other Findings        Anesthesia Plan  ASA Score- 2     Anesthesia Type- epidural with ASA Monitors. Additional Monitors:     Airway Plan:            Plan Factors-    Chart reviewed. Existing labs reviewed. Patient summary reviewed. Patient is not a current smoker. Induction- intravenous. Postoperative Plan-     Informed Consent- Anesthetic plan and risks discussed with patient.

## 2023-10-06 NOTE — L&D DELIVERY NOTE
Vaginal Delivery Summary - OB/GYN   Roberta Merchant 26 y.o. female MRN: [id number]  Unit/Bed#: -01 Encounter: [id number]    Predelivery Diagnosis:  1. Pregnancy at 36w1d  2. Term pregnancy  Single fetus     Postdelivery Diagnosis:  1. Same as above - Delivered  2nd degree laceration    Procedure: Spontaneous Vaginal Delivery, repair of 2nd degree perineal laceration    Attending: Dr. Roscoe Jarvis    Anesthesia: Epidural    QBL: 91 cc  Admission Hg: Recent Labs     10/06/23  0818   HGB 12.1     Admission platelets:   Recent Labs     10/06/23  0818            Complications: none apparent    Specimens: cord blood, arterial and venous cord blood gasses, placenta to pathology    Findings:   1. Viable male at 1134, with APGARS of 8 (1 min) and 9 (5 min),  2. Spontaneous delivery of intact placenta at 1138  3. 2nd degree laceration repaired with 2-0 Vicryl  4. Blood gases:    Umbilical Cord Venous Blood Gas:  Results from last 7 days   Lab Units 10/06/23  1137   PH COV  7.384   PCO2 COV mm HG 34.0   HCO3 COV mmol/L 19.8   BASE EXC COV mmol/L -4.2*   O2 CT CD VB mL/dL 11.5   O2 HGB, VENOUS CORD % 48.3     Umbilical Cord Arterial Blood Gas:  Results from last 7 days   Lab Units 10/06/23  1137   PH COA  7.371   PCO2 COA  38.2   PO2 COA mm HG 17.7   HCO3 COA mmol/L 21.6   BASE EXC COA mmol/L -3.1*   O2 CONTENT CORD ART ml/dl 8.8   O2 HGB, ARTERIAL CORD % 38.5       Disposition:  Patient tolerated the procedure well and was recovering in labor and delivery room     Brief History and Labor Course:    Randall Abdi is a 26 y.o. Victorino Alfaro with an NATA of 2023, by Last Menstrual Period at 36w1d gestation who was admitted for Active labor. Please see labor timeline for complete labor course. The patient was completely dilated at 1108. She began to push at 1111.      Description of procedure    After pushing for 23 minutes, at 1134 patient delivered a viable male , wt pending, apgars of 8 (1 min) and 9 (5 min). The fetal vertex delivered spontaneously. The baby was palpated for a nuchal cord and none was palpated. The anterior shoulder delivered atraumatically with maternal expulsive forces and the assistance of downward traction. The posterior shoulder delivered with maternal expulsive forces and the assistance of upward traction. The remainder of the fetus delivered spontaneously. Upon delivery, the infant was placed on the maternal abdomen and delayed cord clamping was performed. The umbilical cord was then doubly clamped and cut. The infant was noted to cry spontaneously and was moving all extremities appropriately. Arterial and venous cord blood gases and cord blood was collected for analysis. These were promptly sent to the lab. In the immediate post-partum, 30 units of IV pitocin was administered, and the uterus was noted to contract down well with massage and pitocin. The Bilobed placenta delivered spontaneously at 1138 and was noted to have a centrally inserted 3 vessel cord. The vagina, cervix, perineum, and rectum were inspected and there was noted to be a 2nd degree. The laceration was repaired with 2.0 vicryl on a CT needle. Good hemostasis was noted. At the conclusion of the procedure, all needle, sponge, and instrument counts were noted to be correct. Patient tolerated the procedure well and was allowed to recover in labor and delivery room with family and  before being transferred to the post-partum floor.      Jose Juan Carpenter DO

## 2023-10-06 NOTE — PLAN OF CARE
Problem: PAIN - ADULT  Goal: Verbalizes/displays adequate comfort level or baseline comfort level  Description: Interventions:  - Encourage patient to monitor pain and request assistance  - Assess pain using appropriate pain scale  - Administer analgesics based on type and severity of pain and evaluate response  - Implement non-pharmacological measures as appropriate and evaluate response  - Consider cultural and social influences on pain and pain management  - Notify physician/advanced practitioner if interventions unsuccessful or patient reports new pain  Outcome: Progressing     Problem: INFECTION - ADULT  Goal: Absence or prevention of progression during hospitalization  Description: INTERVENTIONS:  - Assess and monitor for signs and symptoms of infection  - Monitor lab/diagnostic results  - Monitor all insertion sites, i.e. indwelling lines, tubes, and drains  - Monitor endotracheal if appropriate and nasal secretions for changes in amount and color  - [place] appropriate cooling/warming therapies per order  - Administer medications as ordered  - Instruct and encourage patient and family to use good hand hygiene technique  - Identify and instruct in appropriate isolation precautions for identified infection/condition  Outcome: Progressing  Goal: Absence of fever/infection during neutropenic period  Description: INTERVENTIONS:  - Monitor WBC    Outcome: Progressing     Problem: SAFETY ADULT  Goal: Patient will remain free of falls  Description: INTERVENTIONS:  - Educate patient/family on patient safety including physical limitations  - Instruct patient to call for assistance with activity   - Consult OT/PT to assist with strengthening/mobility   - Keep Call bell within reach  - Keep bed low and locked with side rails adjusted as appropriate  - Keep care items and personal belongings within reach  - Initiate and maintain comfort rounds    Outcome: Progressing  Goal: Maintain or return to baseline ADL function  Description: INTERVENTIONS:  -  Assess patient's ability to carry out ADLs; assess patient's baseline for ADL function and identify physical deficits which impact ability to perform ADLs (bathing, care of mouth/teeth, toileting, grooming, dressing, etc.)  - Assess/evaluate cause of self-care deficits   - Assess range of motion  - Assess patient's mobility; develop plan if impaired  - Assess patient's need for assistive devices and provide as appropriate  - Encourage maximum independence but intervene and supervise when necessary  - Involve family in performance of ADLs  - Assess for home care needs following discharge   - Consider OT consult to assist with ADL evaluation and planning for discharge  - Provide patient education as appropriate  Outcome: Progressing  Goal: Maintains/Returns to pre admission functional level  Description: INTERVENTIONS:  - Perform BMAT or MOVE assessment daily.   - Set and communicate daily mobility goal to care team and patient/family/caregiver.    - Out of bed for toileting  - Record patient progress and toleration of activity level   Outcome: Progressing     Problem: DISCHARGE PLANNING  Goal: Discharge to home or other facility with appropriate resources  Description: INTERVENTIONS:  - Identify barriers to discharge w/patient and caregiver  - Arrange for needed discharge resources and transportation as appropriate  - Identify discharge learning needs (meds, wound care, etc.)  - Arrange for interpretive services to assist at discharge as needed  - Refer to Case Management Department for coordinating discharge planning if the patient needs post-hospital services based on physician/advanced practitioner order or complex needs related to functional status, cognitive ability, or social support system  Outcome: Progressing     Problem: POSTPARTUM  Goal: Experiences normal postpartum course  Description: INTERVENTIONS:  - Monitor maternal vital signs  - Assess uterine involution and lochia  Outcome: Progressing  Goal: Appropriate maternal -  bonding  Description: INTERVENTIONS:  - Identify family support  - Assess for appropriate maternal/infant bonding   -Encourage maternal/infant bonding opportunities  - Referral to  or  as needed  Outcome: Progressing  Goal: Establishment of infant feeding pattern  Description: INTERVENTIONS:  - Assess breast/bottle feeding  - Refer to lactation as needed  Outcome: Progressing  Goal: Incision(s), wounds(s) or drain site(s) healing without S/S of infection  Description: INTERVENTIONS  - Assess and document dressing, incision, wound bed, drain sites and surrounding tissue  - Provide patient and family education    Outcome: Progressing

## 2023-10-06 NOTE — ANESTHESIA POSTPROCEDURE EVALUATION
Post-Op Assessment Note    CV Status:  Stable  Pain Score: 0    Pain management: adequate     Mental Status:  Alert and awake   Hydration Status:  Euvolemic   PONV Controlled:  Controlled   Airway Patency:  Patent      Post Op Vitals Reviewed: Yes      Staff: CRNA     Post-op block assessment: catheter intact and no complications      No notable events documented.     BP      Temp      Pulse     Resp      SpO2

## 2023-10-06 NOTE — PLAN OF CARE
Problem: Knowledge Deficit  Goal: Verbalizes understanding of labor plan  Description: Assess patient/family/caregiver's baseline knowledge level and ability to understand information. Provide education via patient/family/caregiver's preferred learning method at appropriate level of understanding. 1. Provide teaching at level of understanding. 2. Provide teaching via preferred learning method(s). Outcome: Progressing  Goal: Patient/family/caregiver demonstrates understanding of disease process, treatment plan, medications, and discharge instructions  Description: Complete learning assessment and assess knowledge base. Interventions:  - Provide teaching at level of understanding  - Provide teaching via preferred learning methods  Outcome: Progressing     Problem: Labor & Delivery  Goal: Manages discomfort  Description: Assess and monitor for signs and symptoms of discomfort. Assess patient's pain level regularly and per hospital policy. Administer medications as ordered. Support use of nonpharmacological methods to help control pain such as distraction, imagery, relaxation, and application of heat and cold. Collaborate with interdisciplinary team and patient to determine appropriate pain management plan. 1. Include patient in decisions related to comfort. 2. Offer non-pharmacological pain management interventions. 3. Report ineffective pain management to physician. Outcome: Progressing  Goal: Patient vital signs are stable  Description: 1. Assess vital signs - vaginal delivery.   Outcome: Progressing     Problem: PAIN - ADULT  Goal: Verbalizes/displays adequate comfort level or baseline comfort level  Description: Interventions:  - Encourage patient to monitor pain and request assistance  - Assess pain using appropriate pain scale  - Administer analgesics based on type and severity of pain and evaluate response  - Implement non-pharmacological measures as appropriate and evaluate response  - Consider cultural and social influences on pain and pain management  - Notify physician/advanced practitioner if interventions unsuccessful or patient reports new pain  Outcome: Progressing     Problem: INFECTION - ADULT  Goal: Absence or prevention of progression during hospitalization  Description: INTERVENTIONS:  - Assess and monitor for signs and symptoms of infection  - Monitor lab/diagnostic results  - Monitor all insertion sites, i.e. indwelling lines, tubes, and drains  - Monitor endotracheal if appropriate and nasal secretions for changes in amount and color  - [place] appropriate cooling/warming therapies per order  - Administer medications as ordered  - Instruct and encourage patient and family to use good hand hygiene technique  - Identify and instruct in appropriate isolation precautions for identified infection/condition  Outcome: Progressing  Goal: Absence of fever/infection during neutropenic period  Description: INTERVENTIONS:  - Monitor WBC    Outcome: Progressing     Problem: SAFETY ADULT  Goal: Patient will remain free of falls  Description: INTERVENTIONS:  - Educate patient/family on patient safety including physical limitations  - Instruct patient to call for assistance with activity   - Consult OT/PT to assist with strengthening/mobility   - Keep Call bell within reach  - Keep bed low and locked with side rails adjusted as appropriate  - Keep care items and personal belongings within reach  - Initiate and maintain comfort rounds  - Make Fall Risk Sign visible to staff    - Apply yellow socks and bracelet for high fall risk patients  - Consider moving patient to room near nurses station  Outcome: Progressing  Goal: Maintain or return to baseline ADL function  Description: INTERVENTIONS:  -  Assess patient's ability to carry out ADLs; assess patient's baseline for ADL function and identify physical deficits which impact ability to perform ADLs (bathing, care of mouth/teeth, toileting, grooming, dressing, etc.)  - Assess/evaluate cause of self-care deficits   - Assess range of motion  - Assess patient's mobility; develop plan if impaired  - Assess patient's need for assistive devices and provide as appropriate  - Encourage maximum independence but intervene and supervise when necessary  - Involve family in performance of ADLs  - Assess for home care needs following discharge   - Consider OT consult to assist with ADL evaluation and planning for discharge  - Provide patient education as appropriate  Outcome: Progressing  Goal: Maintains/Returns to pre admission functional level  Description: INTERVENTIONS:  - Perform BMAT or MOVE assessment daily.   - Set and communicate daily mobility goal to care team and patient/family/caregiver.    - Collaborate with rehabilitation services on mobility goals if consulted    - Out of bed for toileting  - Record patient progress and toleration of activity level   Outcome: Progressing     Problem: DISCHARGE PLANNING  Goal: Discharge to home or other facility with appropriate resources  Description: INTERVENTIONS:  - Identify barriers to discharge w/patient and caregiver  - Arrange for needed discharge resources and transportation as appropriate  - Identify discharge learning needs (meds, wound care, etc.)  - Arrange for interpretive services to assist at discharge as needed  - Refer to Case Management Department for coordinating discharge planning if the patient needs post-hospital services based on physician/advanced practitioner order or complex needs related to functional status, cognitive ability, or social support system  Outcome: Progressing

## 2023-10-06 NOTE — OB LABOR/OXYTOCIN SAFETY PROGRESS
Labor Progress Note - Gavin Mejias 26 y.o. female MRN: [id number]    Unit/Bed#: -01 Encounter: [id number]         Cervical Dilation: 10        Cervical Effacement: 100  Fetal Station: 0     Fetal Heart Rate: 132 BPM  FHR Category: I        Vital Signs:   Vitals:    10/06/23 0724   BP: 133/62   Pulse: 84   Resp: 18   Temp: (!) 97.2 °F (36.2 °C)   SpO2: 100%       Notes/comments:  Patient with pelvic pressure.   Pushing well      Kassidy Banda DO 10/6/2023 11:16 AM

## 2023-10-06 NOTE — LACTATION NOTE
This note was copied from a baby's chart. CONSULT - LACTATION  Baby Boy Danni Nicole 0 days male MRN: [id number]    [address] [facility] NURSERY Room / Bed: (N)/(N) Encounter: [id number]    Maternal Information     MOTHER:  Abdullahi Cueva  Maternal Age: 26 y.o.   OB History: # 1 - Date: None, Sex: None, Weight: None, GA: None, Delivery: None, Apgar1: None, Apgar5: None, Living: None, Birth Comments: None   Previouse breast reduction surgery? No    Lactation history:   Has patient previously breast fed: No   How long had patient previously breast fed:     Previous breast feeding complications:       Past Surgical History:   Procedure Laterality Date    OVARIAN CYST REMOVAL Right 2008    ROTATOR CUFF REPAIR Right 2010    WISDOM TOOTH EXTRACTION          Birth information:  YOB: 2025   Time of birth: 11:34 AM   Sex: male   Delivery type: Vaginal, Spontaneous   Birth Weight: No birth weight on file. Percent of Weight Change: Birth weight not on file     Gestational Age: 45w4d   [unfilled]    Assessment     Breast and nipple assessment:  normal assessment with short, everted nipple (used manual breast pump to elongate for latch)     Assessment:  uncoordinated suckle, regulated after 1 minute with suck training, muscle exercises with 10 drops of expressed colostrum. Feeding assessment:  feeding well with stimulation, breast compression.    LATCH:  Latch: Grasps breast, tongue down, lips flanged, rhythmic sucking   Audible Swallowing: A few with stimulation   Type of Nipple: Everted (After stimulation)   Comfort (Breast/Nipple): Soft/non-tender   Hold (Positioning): Partial assist, teach one side, mother does other, staff holds   [facility] Score: 8          Feeding recommendations:   breastfeed on demand, waking every 2-3 hours to encourage feeding (Late ), if latches for 10 minutes or more, no need to use manual breast pump, but encouraged after every other feeding for added stimulation to help with milk supply. Met with parents to discuss feeding plan. Mother would like to breastfeed and discussed that his first feeding, was with expressed colostrum, as he did not latch. The Ready, Set, Baby Booklet was discussed. Discussed importance of skin to skin to help baby awaken for breastfeeding, to help with milk production as well as stabilize temperature, blood sugars, decrease pain, promote relaxation, and calm the baby as well as for bonding that father may do as well. Showed images of tummy size progression as milk production increases to meet the nutritional/growing needs of the baby. Discussed alternative feeding methods as a manner to provide baby with additional colostrum/breast milk if baby is sleepy and/or unable to breastfeed directly to help protect the milk supply and preserve latching abilities at the breast. Mother was encouraged to hand express after feedings to provide "dessert" and when sleepy to hand express to provide "snacks" which could help baby to awaken for a feeding. Discussed “Second Night Syndrome” explaining how baby’s cluster feeds to meet growing needs. Growth spurts periods were discussed within the first year and how cluster feeding helps boost milk supply. Explained feeding cues and fullness cues as well as importance of obtaining a deep latch for effective milk removal and proper positioning (tummy to tummy, at level, nose to nipple, bring chin to breast first and bringing baby to breast) with ear, shoulder, and hip alignment. Mother hand expressed 10 drops that were given to baby via finger feeding with suck training and muscle exercises. Manual pump was used to elongate nipple and latched deeply in a cross cradle, laid back position. Manual pump was left at the bedside with hand on "Breastfeeding the Late  Infant" that was discussed during the encounter.     Addressed breast pump needs and mother discussed that she has a breast pump for home use. Mother was made aware of how to communicate with lactation and encouraged to reach out for continued support and/or questions that arise.     Claire Peck RN 10/6/2023 1:58 PM

## 2023-10-07 LAB
BASOPHILS # BLD AUTO: 0.04 THOUSANDS/ÂΜL (ref 0–0.1)
BASOPHILS NFR BLD AUTO: 0 % (ref 0–1)
EOSINOPHIL # BLD AUTO: 0.04 THOUSAND/ÂΜL (ref 0–0.61)
EOSINOPHIL NFR BLD AUTO: 0 % (ref 0–6)
ERYTHROCYTE [DISTWIDTH] IN BLOOD BY AUTOMATED COUNT: 13.2 % (ref 11.6–15.1)
HCT VFR BLD AUTO: 31.7 % (ref 34.8–46.1)
HGB BLD-MCNC: 10.3 G/DL (ref 11.5–15.4)
IMM GRANULOCYTES # BLD AUTO: 0.11 THOUSAND/UL (ref 0–0.2)
IMM GRANULOCYTES NFR BLD AUTO: 1 % (ref 0–2)
LYMPHOCYTES # BLD AUTO: 1.72 THOUSANDS/ÂΜL (ref 0.6–4.47)
LYMPHOCYTES NFR BLD AUTO: 13 % (ref 14–44)
MCH RBC QN AUTO: 29.7 PG (ref 26.8–34.3)
MCHC RBC AUTO-ENTMCNC: 32.5 G/DL (ref 31.4–37.4)
MCV RBC AUTO: 91 FL (ref 82–98)
MONOCYTES # BLD AUTO: 0.99 THOUSAND/ÂΜL (ref 0.17–1.22)
MONOCYTES NFR BLD AUTO: 7 % (ref 4–12)
NEUTROPHILS # BLD AUTO: 10.88 THOUSANDS/ÂΜL (ref 1.85–7.62)
NEUTS SEG NFR BLD AUTO: 79 % (ref 43–75)
NRBC BLD AUTO-RTO: 0 /100 WBCS
PLATELET # BLD AUTO: 208 THOUSANDS/UL (ref 149–390)
PMV BLD AUTO: 10.2 FL (ref 8.9–12.7)
RBC # BLD AUTO: 3.47 MILLION/UL (ref 3.81–5.12)
WBC # BLD AUTO: 13.78 THOUSAND/UL (ref 4.31–10.16)

## 2023-10-07 PROCEDURE — 99024 POSTOP FOLLOW-UP VISIT: CPT | Performed by: OBSTETRICS & GYNECOLOGY

## 2023-10-07 PROCEDURE — 85025 COMPLETE CBC W/AUTO DIFF WBC: CPT | Performed by: OBSTETRICS & GYNECOLOGY

## 2023-10-07 RX ADMIN — IBUPROFEN 600 MG: 600 TABLET ORAL at 00:09

## 2023-10-07 RX ADMIN — DOCUSATE SODIUM 100 MG: 100 CAPSULE, LIQUID FILLED ORAL at 18:40

## 2023-10-07 RX ADMIN — IBUPROFEN 600 MG: 600 TABLET ORAL at 18:40

## 2023-10-07 RX ADMIN — ACETAMINOPHEN 650 MG: 325 TABLET, FILM COATED ORAL at 22:59

## 2023-10-07 RX ADMIN — IBUPROFEN 600 MG: 600 TABLET ORAL at 05:51

## 2023-10-07 RX ADMIN — DOCUSATE SODIUM 100 MG: 100 CAPSULE, LIQUID FILLED ORAL at 12:42

## 2023-10-07 RX ADMIN — ACETAMINOPHEN 650 MG: 325 TABLET, FILM COATED ORAL at 07:36

## 2023-10-07 RX ADMIN — IBUPROFEN 600 MG: 600 TABLET ORAL at 12:42

## 2023-10-07 RX ADMIN — WITCH HAZEL 1 PAD: 500 SOLUTION RECTAL; TOPICAL at 20:41

## 2023-10-07 RX ADMIN — ACETAMINOPHEN 650 MG: 325 TABLET, FILM COATED ORAL at 16:39

## 2023-10-07 NOTE — PLAN OF CARE
Problem: PAIN - ADULT  Goal: Verbalizes/displays adequate comfort level or baseline comfort level  Description: Interventions:  - Encourage patient to monitor pain and request assistance  - Assess pain using appropriate pain scale  - Administer analgesics based on type and severity of pain and evaluate response  - Implement non-pharmacological measures as appropriate and evaluate response  - Consider cultural and social influences on pain and pain management  - Notify physician/advanced practitioner if interventions unsuccessful or patient reports new pain  Outcome: Progressing     Problem: INFECTION - ADULT  Goal: Absence or prevention of progression during hospitalization  Description: INTERVENTIONS:  - Assess and monitor for signs and symptoms of infection  - Monitor lab/diagnostic results  - Monitor all insertion sites, i.e. indwelling lines, tubes, and drains  - Monitor endotracheal if appropriate and nasal secretions for changes in amount and color  - [place] appropriate cooling/warming therapies per order  - Administer medications as ordered  - Instruct and encourage patient and family to use good hand hygiene technique  - Identify and instruct in appropriate isolation precautions for identified infection/condition  Outcome: Progressing  Goal: Absence of fever/infection during neutropenic period  Description: INTERVENTIONS:  - Monitor WBC    Outcome: Progressing     Problem: SAFETY ADULT  Goal: Patient will remain free of falls  Description: INTERVENTIONS:  - Educate patient/family on patient safety including physical limitations  - Instruct patient to call for assistance with activity   - Consult OT/PT to assist with strengthening/mobility   - Keep Call bell within reach  - Keep bed low and locked with side rails adjusted as appropriate  - Keep care items and personal belongings within reach  - Initiate and maintain comfort rounds  - Make Fall Risk Sign visible to staff  - Apply yellow socks and bracelet for high fall risk patients  - Consider moving patient to room near nurses station  Outcome: Progressing  Goal: Maintain or return to baseline ADL function  Description: INTERVENTIONS:  -  Assess patient's ability to carry out ADLs; assess patient's baseline for ADL function and identify physical deficits which impact ability to perform ADLs (bathing, care of mouth/teeth, toileting, grooming, dressing, etc.)  - Assess/evaluate cause of self-care deficits   - Assess range of motion  - Assess patient's mobility; develop plan if impaired  - Assess patient's need for assistive devices and provide as appropriate  - Encourage maximum independence but intervene and supervise when necessary  - Involve family in performance of ADLs  - Assess for home care needs following discharge   - Consider OT consult to assist with ADL evaluation and planning for discharge  - Provide patient education as appropriate  Outcome: Progressing  Goal: Maintains/Returns to pre admission functional level  Description: INTERVENTIONS:  - Perform BMAT or MOVE assessment daily.   - Set and communicate daily mobility goal to care team and patient/family/caregiver.    - Collaborate with rehabilitation services on mobility goals if consulted  - P  Problem: DISCHARGE PLANNING  Goal: Discharge to home or other facility with appropriate resources  Description: INTERVENTIONS:  - Identify barriers to discharge w/patient and caregiver  - Arrange for needed discharge resources and transportation as appropriate  - Identify discharge learning needs (meds, wound care, etc.)  - Arrange for interpretive services to assist at discharge as needed  - Refer to Case Management Department for coordinating discharge planning if the patient needs post-hospital services based on physician/advanced practitioner order or complex needs related to functional status, cognitive ability, or social support system  Outcome: Progressing     Problem: POSTPARTUM  Goal: Experiences normal postpartum course  Description: INTERVENTIONS:  - Monitor maternal vital signs  - Assess uterine involution and lochia  Outcome: Progressing  Goal: Appropriate maternal -  bonding  Description: INTERVENTIONS:  - Identify family support  - Assess for appropriate maternal/infant bonding   -Encourage maternal/infant bonding opportunities  - Referral to  or  as needed  Outcome: Progressing  Goal: Establishment of infant feeding pattern  Description: INTERVENTIONS:  - Assess breast/bottle feeding  - Refer to lactation as needed  Outcome: Progressing  Goal: Incision(s), wounds(s) or drain site(s) healing without S/S of infection  Description: INTERVENTIONS  - Assess and document dressing, incision, wound bed, drain sites and surrounding tissue  - Provide patient and family education    Outcome: Progressing   - Out of bed for toileting  - Record patient progress and toleration of activity level   Outcome: Progressing

## 2023-10-07 NOTE — PROGRESS NOTES
Progress Note - OB/GYN  Post-Partum Physician Note   Jessy Chavez 26 y.o. female MRN: [id number]  Unit/Bed#:  203-01 Encounter: [id number]    Patient is postpartum day 1 from a Vaginal, Spontaneous  with a 2nd degree laceration and Anesthesia: epidural    Subjective:   Pain: no  Tolerating Oral Intake: yes  Voiding: yes  Flatus: yes  Bowel Movement: no  Ambulating: yes  Breastfeeding: Breastfeeding and bottle  Shortness of Breath: no  Leg Pain/Discomfort: no  Lochia: normal      Objective:   Vitals:    10/06/23 2300 10/07/23 0316 10/07/23 1100 10/07/23 1300   BP: 111/68 133/82 140/91    BP Location: Left arm Right arm Right arm    Pulse: 82 69 86 76   Resp: 16 16 18    Temp: 98 °F (36.7 °C) 98 °F (36.7 °C) 98 °F (36.7 °C)    TempSrc: Oral Oral Oral    SpO2: 98% 99% 98%    Weight:       Height:           Intake/Output Summary (Last 24 hours) at 10/7/2023 1332  Last data filed at 10/6/2023 1715  Gross per 24 hour   Intake --   Output 1600 ml   Net -1600 ml       Physical Exam:  General: in no apparent distress  Abdomen: abdomen is soft without significant tenderness  Fundus: Firm and non-tender, 1 below the umbilicus  Perineum: normal  Lower extremities: nontender      Labs/Tests:   Lab Results   Component Value Date    WBC 13.78 (H) 10/07/2023    HGB 10.3 (L) 10/07/2023    HCT 31.7 (L) 10/07/2023    MCV 91 10/07/2023     10/07/2023       Brief OB Lab review:  ABO Grouping   Date Value Ref Range Status   10/06/2023 A  Final      Rh Factor   Date Value Ref Range Status   10/06/2023 Positive  Final     Rh Type   Date Value Ref Range Status   03/31/2023 RH(D) POSITIVE  Final     Comment:        For additional information, please refer to   [email or web address] [email or web address]   (This link is being provided for informational/  educational purposes only.)      No results found for: "ANTIBODYSCR"  No results found for: "RUBM"    MEDS:   Current Facility-Administered Medications   Medication Dose Route Frequency    acetaminophen (TYLENOL) tablet 650 mg  650 mg Oral Q6H PRN    benzocaine-menthol-lanolin-aloe (DERMOPLAST) 20-0.5 % topical spray 1 Application  1 Application Topical X2K PRN    calcium carbonate (TUMS) chewable tablet 1,000 mg  1,000 mg Oral Daily PRN    diphenhydrAMINE (BENADRYL) injection 25 mg  25 mg Intravenous Q6H PRN    docusate sodium (COLACE) capsule 100 mg  100 mg Oral Daily    docusate sodium (COLACE) capsule 100 mg  100 mg Oral BID    hydrocortisone 1 % cream 1 Application  1 Application Topical Daily PRN    ibuprofen (MOTRIN) tablet 600 mg  600 mg Oral Q6H PRN    lactated ringers infusion  125 mL/hr Intravenous Continuous    loratadine (CLARITIN) tablet 10 mg  10 mg Oral Daily PRN    ondansetron (ZOFRAN) injection 4 mg  4 mg Intravenous Q8H PRN    prenatal multivitamin tablet 1 tablet  1 tablet Oral Daily    witch hazel-glycerin (TUCKS) topical pad 1 Pad  1 Pad Topical Q4H PRN     Facility-Administered Medications Ordered in Other Encounters   Medication Dose Route Frequency    bupivacaine (PF) (MARCAINE) 0.25 % injection   Epidural PRN    lidocaine (PF) (XYLOCAINE-MPF) 1 % injection   Other PRN    lidocaine-epinephrine (XYLOCAINE-MPF/EPINEPHRINE) 1.5 %-1:200,000 injection   Epidural PRN    ropivacaine (NAROPIN) injection   Epidural Continuous PRN     Invasive Devices       None                   Assessment and Plan:  26y.o. year-old , postpartum day 1 status-post  Vaginal, Spontaneous  and 2nd degree laceration. Continue routine postpartum care  Encourage ambulation    Planning discharge tomorrow    Single elevated blood pressure   - one BP this /91. Pt very stressed at the time. Will watch closely. No signs of pre-eclampsia.     Kerline Topete MD

## 2023-10-07 NOTE — LACTATION NOTE
This note was copied from a baby's chart. CONSULT - LACTATION  Baby Boy Joaquin Nicole 1 days male MRN: [id number]    [facility] NURSERY Room / Bed: (N)/(N) Encounter: [id number]    Maternal Information     MOTHER:  Robb Yoder  Maternal Age: 26 y.o.   OB History: # 1 - Date: 10/06/23, Sex: Male, Weight: 2670 g (5 lb 14.2 oz), GA: 36w1d, Delivery: Vaginal, Spontaneous, Apgar1: 8, Apgar5: 9, Living: Living, Birth Comments: None   Previouse breast reduction surgery? No    Lactation history:   Has patient previously breast fed: No   How long had patient previously breast fed:     Previous breast feeding complications:       Past Surgical History:   Procedure Laterality Date    OVARIAN CYST REMOVAL Right 2008    ROTATOR CUFF REPAIR Right 2010    WISDOM TOOTH EXTRACTION          Birth information:  YOB: 2025   Time of birth: 11:34 AM   Sex: male   Delivery type: Vaginal, Spontaneous   Birth Weight: 2670 g (5 lb 14.2 oz)   Percent of Weight Change: -2%     Gestational Age: 45w4d   [unfilled]    Assessment     Breast and nipple assessment:  normal assessment with short everted nipple, use of compression/nipple shield introduced    [place] Assessment:  tongue folds, comes to lower inner lip. Cups on lip of finger during suck training    Feeding assessment:  baby would latch on breast and fall asleep, fuss at breast with compression. Nipple shield introduced and baby able to sustain latch, pull nipple out.   LATCH:  Latch: Grasps breast, tongue down, lips flanged, rhythmic sucking   Audible Swallowing: A few with stimulation   Type of Nipple: Everted (After stimulation)   Comfort (Breast/Nipple): Soft/non-tender   Hold (Positioning): Partial assist, teach one side, mother does other, staff holds   [facility] Score: 8          Feeding recommendations:   breastfeed on demand, if using nipple shield, pump/hand express after and/or every other feedings. Supplement with expressed colostrum/breast milk and/or formula (5-10 ml)    Met with parents to follow up. Mother discussed that she is still having baby get sleepy at the breast. Attempted to awaken, given 7 drops of colostrum. Introduced latch assist, compression then nipple shield to help with feeding. Baby fed around 15 minutes on both sides. Mother was set up with personal breast pump and encouraged to pump with use of nipple shield and due to late  gestation. Father paced bottle fed and baby took 5 ml during the feeding. Discussed expected output towards the end of the week for baby and engorgement. Encouraged follow up with lactation outpatient. Parents will be looking for support close to home, but were recommended to look for support at [address] if unable to find close to home. Baby is currently at a 1.6% weight loss, having appropriate output for his age and 24 hour bilirubin will be checked soon. Parents were encouraged to call for further support and/or questions that arise. Primary RN will be provided with education handouts on "Breastfeeding with a Nipple Shield", "Increasing the Milk Supply" and a pumping log that was discussed during the encounter.     Karlo Hathaway RN 10/7/2023 12:34 PM

## 2023-10-08 VITALS
TEMPERATURE: 97.7 F | DIASTOLIC BLOOD PRESSURE: 58 MMHG | WEIGHT: 190 LBS | BODY MASS INDEX: 34.96 KG/M2 | HEIGHT: 62 IN | OXYGEN SATURATION: 97 % | HEART RATE: 79 BPM | SYSTOLIC BLOOD PRESSURE: 111 MMHG | RESPIRATION RATE: 17 BRPM

## 2023-10-08 PROCEDURE — 99024 POSTOP FOLLOW-UP VISIT: CPT | Performed by: OBSTETRICS & GYNECOLOGY

## 2023-10-08 PROCEDURE — NC001 PR NO CHARGE: Performed by: OBSTETRICS & GYNECOLOGY

## 2023-10-08 RX ORDER — IBUPROFEN 600 MG/1
600 TABLET ORAL EVERY 6 HOURS PRN
Refills: 0
Start: 2023-10-08

## 2023-10-08 RX ORDER — ACETAMINOPHEN 325 MG/1
650 TABLET ORAL EVERY 6 HOURS PRN
Refills: 0
Start: 2023-10-08

## 2023-10-08 RX ADMIN — Medication 1 TABLET: at 08:18

## 2023-10-08 RX ADMIN — ACETAMINOPHEN 650 MG: 325 TABLET, FILM COATED ORAL at 08:18

## 2023-10-08 RX ADMIN — IBUPROFEN 600 MG: 600 TABLET ORAL at 03:24

## 2023-10-08 RX ADMIN — DOCUSATE SODIUM 100 MG: 100 CAPSULE, LIQUID FILLED ORAL at 08:18

## 2023-10-08 NOTE — PROGRESS NOTES
Progress Note - OB/GYN  Post-Partum Physician Note   Mihir Model 26 y.o. female MRN: [id number]  Unit/Bed#: -01 Encounter: [id number]    Patient is postpartum day 2 from a Vaginal, Spontaneous  with a 2nd degree laceration and Anesthesia: epidural    Subjective:   Pain: controlled  Tolerating Oral Intake: yes  Voiding: yes  Flatus: yes  Bowel Movement: no  Ambulating: yes  Breastfeeding: Breastfeeding and Bottle feeding  Shortness of Breath: no  Leg Pain/Discomfort: no  Lochia: normal      Objective:   Vitals:    10/07/23 1300 10/07/23 1600 10/07/23 2300 10/08/23 0807   BP: 119/73 133/72 138/89 111/58   BP Location: Right arm Right arm Right arm Left arm   Pulse: 76 74 74 79   Resp:  18 16 17   Temp:  97.9 °F (36.6 °C) (!) 97 °F (36.1 °C) 97.7 °F (36.5 °C)   TempSrc:  Oral Oral Oral   SpO2:  99% 99% 97%   Weight:       Height:         No intake or output data in the 24 hours ending 10/08/23 0931    Physical Exam:  General: in no apparent distress  Abdomen: abdomen is soft without significant tenderness  Fundus: Firm and non-tender, 1 below the umbilicus  Perineum: normal  Lower extremities: nontender      Labs/Tests:   Lab Results   Component Value Date    WBC 13.78 (H) 10/07/2023    HGB 10.3 (L) 10/07/2023    HCT 31.7 (L) 10/07/2023    MCV 91 10/07/2023     10/07/2023       Brief OB Lab review:  ABO Grouping   Date Value Ref Range Status   10/06/2023 A  Final      Rh Factor   Date Value Ref Range Status   10/06/2023 Positive  Final     Rh Type   Date Value Ref Range Status   03/31/2023 RH(D) POSITIVE  Final     Comment:        For additional information, please refer to   http://education. Identified/faq/DVM199   (This link is being provided for informational/  educational purposes only.)      No results found for: "ANTIBODYSCR"  No results found for: "RUBM"    MEDS:   Current Facility-Administered Medications   Medication Dose Route Frequency    acetaminophen (TYLENOL) tablet 650 mg  650 mg Oral Q6H PRN    benzocaine-menthol-lanolin-aloe (DERMOPLAST) 20-0.5 % topical spray 1 Application  1 Application Topical H2C PRN    calcium carbonate (TUMS) chewable tablet 1,000 mg  1,000 mg Oral Daily PRN    diphenhydrAMINE (BENADRYL) injection 25 mg  25 mg Intravenous Q6H PRN    docusate sodium (COLACE) capsule 100 mg  100 mg Oral BID    hydrocortisone 1 % cream 1 Application  1 Application Topical Daily PRN    ibuprofen (MOTRIN) tablet 600 mg  600 mg Oral Q6H PRN    lactated ringers infusion  125 mL/hr Intravenous Continuous    loratadine (CLARITIN) tablet 10 mg  10 mg Oral Daily PRN    ondansetron (ZOFRAN) injection 4 mg  4 mg Intravenous Q8H PRN    prenatal multivitamin tablet 1 tablet  1 tablet Oral Daily    witch hazel-glycerin (TUCKS) topical pad 1 Pad  1 Pad Topical Q4H PRN     Facility-Administered Medications Ordered in Other Encounters   Medication Dose Route Frequency    bupivacaine (PF) (MARCAINE) 0.25 % injection   Epidural PRN    lidocaine (PF) (XYLOCAINE-MPF) 1 % injection   Other PRN    lidocaine-epinephrine (XYLOCAINE-MPF/EPINEPHRINE) 1.5 %-1:200,000 injection   Epidural PRN    ropivacaine (NAROPIN) injection   Epidural Continuous PRN     Invasive Devices       None                   Assessment and Plan:  26y.o. year-old , postpartum day 2 status-post  Vaginal, Spontaneous  and 2nd degree laceration.     Continue routine postpartum care  Encourage ambulation    Planning discharge today    Postpartum anemia   - mild with postpartum hgb 10.3g/dl from 12.1 on admission, asx   - will resume PNV which has iron in it    Theta MD Nai

## 2023-10-08 NOTE — PLAN OF CARE
Problem: PAIN - ADULT  Goal: Verbalizes/displays adequate comfort level or baseline comfort level  Description: Interventions:  - Encourage patient to monitor pain and request assistance  - Assess pain using appropriate pain scale  - Administer analgesics based on type and severity of pain and evaluate response  - Implement non-pharmacological measures as appropriate and evaluate response  - Consider cultural and social influences on pain and pain management  - Notify physician/advanced practitioner if interventions unsuccessful or patient reports new pain  Outcome: Progressing     Problem: INFECTION - ADULT  Goal: Absence or prevention of progression during hospitalization  Description: INTERVENTIONS:  - Assess and monitor for signs and symptoms of infection  - Monitor lab/diagnostic results  - Monitor all insertion sites, i.e. indwelling lines, tubes, and drains  - Monitor endotracheal if appropriate and nasal secretions for changes in amount and color  - [place] appropriate cooling/warming therapies per order  - Administer medications as ordered  - Instruct and encourage patient and family to use good hand hygiene technique  - Identify and instruct in appropriate isolation precautions for identified infection/condition  Outcome: Progressing  Goal: Absence of fever/infection during neutropenic period  Description: INTERVENTIONS:  - Monitor WBC    Outcome: Progressing     Problem: SAFETY ADULT  Goal: Patient will remain free of falls  Description: INTERVENTIONS:  - Educate patient/family on patient safety including physical limitations  - Instruct patient to call for assistance with activity   - Consult OT/PT to assist with strengthening/mobility   - Keep Call bell within reach  - Keep bed low and locked with side rails adjusted as appropriate  - Keep care items and personal belongings within reach  - Initiate and maintain comfort rounds  - Make Fall Risk Sign visible to staff  - Offer Toileting every  Hours, in advance of need  - Initiate/Maintain alarm  - Obtain necessary fall risk management equipment:   - Apply yellow socks and bracelet for high fall risk patients  - Consider moving patient to room near nurses station  Outcome: Progressing  Goal: Maintain or return to baseline ADL function  Description: INTERVENTIONS:  -  Assess patient's ability to carry out ADLs; assess patient's baseline for ADL function and identify physical deficits which impact ability to perform ADLs (bathing, care of mouth/teeth, toileting, grooming, dressing, etc.)  - Assess/evaluate cause of self-care deficits   - Assess range of motion  - Assess patient's mobility; develop plan if impaired  - Assess patient's need for assistive devices and provide as appropriate  - Encourage maximum independence but intervene and supervise when necessary  - Involve family in performance of ADLs  - Assess for home care needs following discharge   - Consider OT consult to assist with ADL evaluation and planning for discharge  - Provide patient education as appropriate  Outcome: Progressing  Goal: Maintains/Returns to pre admission functional level  Description: INTERVENTIONS:  - Perform BMAT or MOVE assessment daily.   - Set and communicate daily mobility goal to care team and patient/family/caregiver. - Collaborate with rehabilitation services on mobility goals if consulted  - Perform Range of Motion  times a day. - Reposition patient every  hours.   - Dangle patient  times a day  - Stand patient times a day  - Ambulate patient  times a day  - Out of bed to chair  times a day   - Out of bed for meals  times a day  - Out of bed for toileting  - Record patient progress and toleration of activity level   Outcome: Progressing     Problem: DISCHARGE PLANNING  Goal: Discharge to home or other facility with appropriate resources  Description: INTERVENTIONS:  - Identify barriers to discharge w/patient and caregiver  - Arrange for needed discharge resources and transportation as appropriate  - Identify discharge learning needs (meds, wound care, etc.)  - Arrange for interpretive services to assist at discharge as needed  - Refer to Case Management Department for coordinating discharge planning if the patient needs post-hospital services based on physician/advanced practitioner order or complex needs related to functional status, cognitive ability, or social support system  Outcome: Progressing     Problem: POSTPARTUM  Goal: Experiences normal postpartum course  Description: INTERVENTIONS:  - Monitor maternal vital signs  - Assess uterine involution and lochia  Outcome: Progressing  Goal: Appropriate maternal -  bonding  Description: INTERVENTIONS:  - Identify family support  - Assess for appropriate maternal/infant bonding   -Encourage maternal/infant bonding opportunities  - Referral to  or  as needed  Outcome: Progressing  Goal: Establishment of infant feeding pattern  Description: INTERVENTIONS:  - Assess breast/bottle feeding  - Refer to lactation as needed  Outcome: Progressing  Goal: Incision(s), wounds(s) or drain site(s) healing without S/S of infection  Description: INTERVENTIONS  - Assess and document dressing, incision, wound bed, drain sites and surrounding tissue  - Provide patient and family education  - Perform skin care/dressing changes every   Outcome: Progressing

## 2023-10-08 NOTE — PLAN OF CARE
Problem: PAIN - ADULT  Goal: Verbalizes/displays adequate comfort level or baseline comfort level  Description: Interventions:  - Encourage patient to monitor pain and request assistance  - Assess pain using appropriate pain scale  - Administer analgesics based on type and severity of pain and evaluate response  - Implement non-pharmacological measures as appropriate and evaluate response  - Consider cultural and social influences on pain and pain management  - Notify physician/advanced practitioner if interventions unsuccessful or patient reports new pain  Outcome: Adequate for Discharge     Problem: INFECTION - ADULT  Goal: Absence or prevention of progression during hospitalization  Description: INTERVENTIONS:  - Assess and monitor for signs and symptoms of infection  - Monitor lab/diagnostic results  - Monitor all insertion sites, i.e. indwelling lines, tubes, and drains  - Monitor endotracheal if appropriate and nasal secretions for changes in amount and color  - [place] appropriate cooling/warming therapies per order  - Administer medications as ordered  - Instruct and encourage patient and family to use good hand hygiene technique  - Identify and instruct in appropriate isolation precautions for identified infection/condition  Outcome: Adequate for Discharge  Goal: Absence of fever/infection during neutropenic period  Description: INTERVENTIONS:  - Monitor WBC    Outcome: Adequate for Discharge     Problem: SAFETY ADULT  Goal: Patient will remain free of falls  Description: INTERVENTIONS:  - Educate patient/family on patient safety including physical limitations  - Instruct patient to call for assistance with activity   - Consult OT/PT to assist with strengthening/mobility   - Keep Call bell within reach  - Keep bed low and locked with side rails adjusted as appropriate  - Keep care items and personal belongings within reach  - Initiate and maintain comfort rounds  - Make Fall Risk Sign visible to staff  - Apply yellow socks and bracelet for high fall risk patients  - Consider moving patient to room near nurses station  Outcome: Adequate for Discharge  Goal: Maintain or return to baseline ADL function  Description: INTERVENTIONS:  -  Assess patient's ability to carry out ADLs; assess patient's baseline for ADL function and identify physical deficits which impact ability to perform ADLs (bathing, care of mouth/teeth, toileting, grooming, dressing, etc.)  - Assess/evaluate cause of self-care deficits   - Assess range of motion  - Assess patient's mobility; develop plan if impaired  - Assess patient's need for assistive devices and provide as appropriate  - Encourage maximum independence but intervene and supervise when necessary  - Involve family in performance of ADLs  - Assess for home care needs following discharge   - Consider OT consult to assist with ADL evaluation and planning for discharge  - Provide patient education as appropriate  Outcome: Adequate for Discharge  Goal: Maintains/Returns to pre admission functional level  Description: INTERVENTIONS:  - Perform BMAT or MOVE assessment daily.   - Set and communicate daily mobility goal to care team and patient/family/caregiver.    - Collaborate with rehabilitation services on mobility goals if consulted  - Out of bed for toileting  - Record patient progress and toleration of activity level   Outcome: Adequate for Discharge     Problem: DISCHARGE PLANNING  Goal: Discharge to home or other facility with appropriate resources  Description: INTERVENTIONS:  - Identify barriers to discharge w/patient and caregiver  - Arrange for needed discharge resources and transportation as appropriate  - Identify discharge learning needs (meds, wound care, etc.)  - Arrange for interpretive services to assist at discharge as needed  - Refer to Case Management Department for coordinating discharge planning if the patient needs post-hospital services based on physician/advanced practitioner order or complex needs related to functional status, cognitive ability, or social support system  Outcome: Adequate for Discharge     Problem: POSTPARTUM  Goal: Experiences normal postpartum course  Description: INTERVENTIONS:  - Monitor maternal vital signs  - Assess uterine involution and lochia  Outcome: Adequate for Discharge  Goal: Appropriate maternal -  bonding  Description: INTERVENTIONS:  - Identify family support  - Assess for appropriate maternal/infant bonding   -Encourage maternal/infant bonding opportunities  - Referral to  or  as needed  Outcome: Adequate for Discharge  Goal: Establishment of infant feeding pattern  Description: INTERVENTIONS:  - Assess breast/bottle feeding  - Refer to lactation as needed  Outcome: Adequate for Discharge  Goal: Incision(s), wounds(s) or drain site(s) healing without S/S of infection  Description: INTERVENTIONS  - Assess and document dressing, incision, wound bed, drain sites and surrounding tissue  - Provide patient and family education  Outcome: Adequate for Discharge

## 2023-10-09 ENCOUNTER — TELEPHONE (OUTPATIENT)
Dept: POSTPARTUM | Facility: CLINIC | Age: 33
End: 2023-10-09

## 2023-10-09 NOTE — TELEPHONE ENCOUNTER
Patient called wanting to schedule for lactation services at [facility]. Advised patient that unfortunately I do not have access to schedule for [place], but I would send a message to the  who would be in touch tomorrow 10-10-23. Patient understood and agreed. Patient's call back number: [phone].

## 2023-10-10 NOTE — DISCHARGE SUMMARY
Discharge Summary - OB/GYN   Michelle Smith 26 y.o. female MRN: [id number]  Unit/Bed#: -01 Encounter: [id number]    Admission Date: 10/6/2023     Discharge Date: 10/8/2023    Principal Diagnosis: Labor,  Pregnancy at 36w1d    Secondary Diagnosis: 2nd degree laceration    Attending - Delivery: Mayra Trimble ,          - Discharge: Donn Manzanares MD    Procedures: Vaginal, Spontaneous , 2nd degree laceration repair    Anesthesia: epidural    Complications: none apparent    Hospital Course:      Michelle Smith is a 26 y.o. Joaquina Zarate at 36w1d who was admitted for Labor. She delivered a viable male  with weight of 5lbs, 14.2oz, apgars of 8 (1 min) and 9 (5 min).  was transferred to  nursery. Patient tolerated the procedure well and was transferred to recovery in stable condition. Her postpartum course was uncomplicated. Admission hemoglobin was 12.1g/dl, postpartum was 10.3g/dl. On day of discharge, she was ambulating and able to reasonably perform all ADLs. She was voiding and had appropriate bowel function. Pain was well controlled. She was discharged home on postpartum day #2 without complications. Patient was instructed to follow up with her OB as an outpatient and was given appropriate warnings to call provider if she develops signs of infection or uncontrolled pain. Condition at discharge: good     Discharge instructions/Information to patient and family:   See after visit summary for information provided to patient and family. Provisions for Follow-Up Care:  See after visit summary for information related to follow-up care and any pertinent home health orders. Disposition: Home    Planned Readmission: No    Discharge Medications: For a complete list of the patient's medications, please refer to her med rec.     Donn Manzanares MD

## 2023-10-12 ENCOUNTER — OFFICE VISIT (OUTPATIENT)
Dept: POSTPARTUM | Facility: CLINIC | Age: 33
End: 2023-10-12

## 2023-10-12 PROCEDURE — 88307 TISSUE EXAM BY PATHOLOGIST: CPT | Performed by: PATHOLOGY

## 2023-10-12 NOTE — PROGRESS NOTES
INITIAL BREAST FEEDING EVALUATION    Informant/Relationship: Roberta (mom/self), Mike (FOB)     Discussion of General Lactation Issues: Isabell Cleaning was born at 42 weeks, family had some support from lactation in the hospital, but he did not latch. Now he is latching really well for a few minutes only during feeding attempts. He will keep the nipple in his mouth but falls asleep quickly. Baby does well with the bottles. Now Mom is attempting the breast each feeding, supplementing with formula via bottle, she is now offering some pumped milk. Initially scheduled the appointment for engorgement pain, this has subsided. Infant is 10days old today.  History:  Fertility Problem:no  Breast changes:yes - enlargement, color change, tenderness  : yes - water broke at home, family delivered after 25 min of pushing without augmentation of labor,   Full term: 36 and 1 weeks    labor:yes - SROMed at 36 weeks   First nursing/attempt < 1 hour after birth:no  Skin to skin following delivery:yes - immediately and for 2 hours   Breast changes after delivery:yes - day 3-4 postpartum   Rooming in (infant in room with mother with exception of procedures, eg. Circumcision: no, yes - left for circ procedure, car seat test, and stayed in NBN   Blood sugar issues:yes - a few low blood sugars   NICU stay:no  Jaundice:no  Phototherapy:no  Supplement given: (list supplement and method used as well as reason(s):yes - given for sugars.      Past Medical History:   Diagnosis Date    ADHD     managed by PCP, On Adderall until 2023    Migraine 2006    managed with OTC medications    Seasonal allergies          Current Outpatient Medications:     acetaminophen (TYLENOL) 325 mg tablet, Take 2 tablets (650 mg total) by mouth every 6 (six) hours as needed (mild pain), Disp: , Rfl: 0    cetirizine (ZyrTEC) 10 mg tablet, Take 10 mg by mouth if needed for allergies, Disp: , Rfl:     docusate sodium (COLACE) 100 mg capsule, Take 100 mg by mouth if needed, Disp: , Rfl:     ibuprofen (MOTRIN) 600 mg tablet, Take 1 tablet (600 mg total) by mouth every 6 (six) hours as needed for mild pain, Disp: , Rfl: 0    Magnesium 250 MG TABS, Take 1 tablet by mouth Daily at 2am, Disp: , Rfl:     Prenatal MV & Min w/FA-DHA (ONE A DAY PRENATAL PO), Take by mouth, Disp: , Rfl:     No Known Allergies    Social History     Substance and Sexual Activity   Drug Use Never       Social History     Interval Breastfeeding History:    Frequency of breast feeding: offering every 2-3 hours during the day   Does mother feel breastfeeding is effective: No  Does infant appear satisfied after nursing:If no, explain: he is sleepy, but is active and awake on the bottle   Stooling pattern normal: lYes  Urinating frequently:Yes  Using shield or shells: Yes     Alternative/Artificial Feedings:   Bottle: Yes, Volufeed and Evenflow Balace + bottle, slow flow nipples. Cup: No  Syringe/Finger: No           Formula Type: Similac Neosure                      Amount: 15-40 mL, 20-35mL is typical             Breast Milk:                      Amount: offering breast milk first             Frequency Q 2-3 hours Hr between feedings  Elimination Problems: No      Equipment:  Nipple Shield             Type: Lansinoh              Size: 16 mm              Frequency of Use: was using more often in the hospital, they've kind of given up on that. Pump            Type: UniMom [employer] and Medela hand pump             Frequency of Use: 3-4 times per day after offering each breast. Expressing about 1-1.5 ounces total.     They have been using the correct flange size which Mom measured. Equipment Problems: no    Mom:  Breast: Normal, full feeling, rounded, closely spaced. Bruising noted about 4 inched from her nipple bilaterally, states this is from hand expressing in the hospital   Nipple Assessment in General: Normal: elongated/eraser, no discoloration and no damage noted.   Mother's Awareness of Feeding Cues                 Recognizes: Yes                  Verbalizes: Yes  Support System: Mom has good support at home, FOB is supportive, family support   History of Breastfeeding: first time breastfeeding   Changes/Stressors/Violence: Sleep depravation, but FOB has 6 weeks off and Mom has 12 weeks off. New parents, learning and adjusting to life with a . Concerns/Goals: family would like to work towards more direct breastfeeding. Problems with Mom: None today     Physical Exam  Constitutional:       Appearance: Normal appearance. HENT:      Head: Normocephalic. Cardiovascular:      Rate and Rhythm: Normal rate. Musculoskeletal:         General: Normal range of motion. Cervical back: Normal range of motion. Neurological:      General: No focal deficit present. Mental Status: She is alert and oriented to person, place, and time. Skin:     General: Skin is warm and dry. Capillary Refill: Capillary refill takes less than 2 seconds. Psychiatric:         Mood and Affect: Mood normal.         Behavior: Behavior normal.         Thought Content: Thought content normal.         Judgment: Judgment normal.         Infant:  Behaviors: Sleepy  Color: Pink  Birth weight: 2670 g  Current weight: 2480 g     Problems with infant: LPI       General Appearance:  Alert, active, no distress                             Head:  Normocephalic, AFOF, sutures opposed                             Eyes:  Conjunctiva clear, no drainage                              Ears:  Normally placed, no anomolies                             Nose:  Septum intact, no drainage or erythema                           Mouth:  No lesions. Good tip to palate lift, tongue rest at the roof of the mouth. Thick anchor to the floor of the mouth and attachment well behind the tongue tip.                      Neck:  Supple, symmetrical, trachea midline,                 Respiratory:  No grunting, flaring, retractions, breath sounds clear and equal            Cardiovascular:  Regular rate and rhythm. No murmur. Adequate perfusion/capillary refill. Femoral pulse present                    Abdomen:   Soft, non-tender, no masses, bowel sounds present, no HSM. Umbilical stump intact             Genitourinary:  Normal male, testes descended, no discharge, swelling, or pain, anus patent. Healing circ                           Spine:   No abnormalities noted        Musculoskeletal:  Full range of motion          Skin/Hair/Nails:   Skin warm, dry, and intact, no rashes or abnormal dyspigmentation or lesions                Neurologic:   No abnormal movement, tone appropriate for gestational age    Jennifer Assessment for Lingual Frenulum Function    Appearance Items Function Items   Appearance of tongue when lifted  2: Round or square   Lateralization  2: Complete   Elasticity of frenulum  2: Very elastic   Lift of tongue  2: Tip to mid-mouth     Length of lingual frenulum when tongue lifted  lingual frenulum length: 2: > 1cm     Extension of tongue  2: Tip over lower lip   Attachment of lingual frenulum to tongue  2: Posterior to tip   Spread of anterior tongue  2: Complete   Attachment of lingual frenulum to inferior alveolar ridge  2: Attached to floor of mouth or well below ridge Cupping  2: Entire edge, firm cup   Ankyloglossia Grading:  Class I: mild, 12-16 mm  Class II: moderate, 8-11 mm  Class III: severe, 3-7 mm  ClassIV: complete, less than 3 mm Peristalsis  2: Complete, anterior to posterior       SCORE:    Appearance: 10 (<8=ankyloglossia)  Function: 14 (<11=ankyloglossia) Snapback  2: None        Charlotte Latch:  Efficiency:               Lips Flanged: Yes              Depth of latch: wide latch observed               Audible Swallow:  Yes              Visible Milk: Yes              Wide Open/ Asymmetrical: Yes              Suck Swallow Cycle: Breathing: yes, Coordinated: yes  Nipple Assessment after latch: Normal: elongated/eraser, no discoloration and no damage noted. Latch Problems: Baby is sleepy, and at times his tongue lifts to to the roof of the mouth and does not allow the nipple to be drawn in. Reviewed with Mom how to watch for signs of good suction and effective feeding. Wide latch obtained, he does seem to have up and down jaw motion vs rocker jaw motion, but regular swallows. Breast compressions and chin support offered throughout to keep him active. Mom returns the demonstration on the opposite breast.     Position:  Infant's Ergonomics/Body               Body Alignment: Yes               Head Supported: Yes               Close to Mom's body/ Lifted/ Supported: Yes               Mom's Ergonomics/Body: Yes                           Supported: Yes                           Sitting Back: Yes                           Brings Baby to her breast: Yes  Positioning Problems: Both Mom and baby appear comfortably positioned in laid back/biological nurturing hold, Mom returns demonstration beautifully on the opposite breast.      Handouts:   Paced bottle feeding, Breastfeeding Your Late  Infant, and Breast Compressions     Education:  Reviewed Latch: importance of deep latch without pain. Reviewed Positioning for Dyad: proper alignment and head angle when positioning at the breast   Reviewed Frequency/Supply & Demand: offer the breast at each feeding, pump if baby is not latching and effective transferring milk. Reviewed Infant:Cues and varied States of Awareness: watch for hunger cues, feed on demand.  If baby seems satisfied at the breast (calm, relaxed sleeping, breasts are softer) no need to pump or supplement   Reviewed Infant Elimination: goal of 6+ wets and 2-3 stools per day   Reviewed Alternative/Artificial Feedings: paced bottle feeding technique demonstrated  Reviewed Mom/Breast care: gentle handling of the breast at all times, discussed lymphatic drainage and reverse pressure softening, as well as tips for healing sore nipples. Reviewed Equipment: Hand pump and electric pump general guidance, Discussed proper flange fit, how to measure        Plan:    Reassurance provided that baby is growing well at this time and family is working hard to progress with breastfeeding. Cont with positioning adjustments and watch for signs of effective feeding. Pump if wanting to replace feeding at the breast with bottle feeding or if latching becomes painful. Gentle handling of the breast at all times to preserve integrity. [place]ire for breastfeeding support as needed or ongoing concerns with latching comfort and milk transfer. I have spent 90 minutes with Patient and family today in which greater than 50% of this time was spent in counseling/coordination of care regarding Patient and family education.

## 2023-10-12 NOTE — PATIENT INSTRUCTIONS
-While Shelly Bridges is latched, watch for signs throughout the feeding that baby is effectively removing milk. You will feel strong tugging, hear swallowing and will feel your breasts get softer after nursing.    -Pump (using hand pump or electric pump) as needed for missed feedings at the breast or for uncomfortable engorgement, utilize flange fit and settings that are comfortable for you, pumping should not be painful!   -Goal of 8-10 times that your breasts are stimulated, whether that be from pumping or good active nursing sessions, recommended to promote a good milk supply. -Breast compressions throughout the feeding and pumping sessions can help promote milk flow. Gentle handling of your breasts recommended all the times to preserve breast integrity.   - Storing and Thawing Breast Milk  [place] Breastfeeding Support (usda.gov)    -Please call or scheduled a follow up appointment with lactation as needed for questions or concerns you may have.     -Follow up for ongoing lactation support in one week.

## 2023-10-19 ENCOUNTER — OFFICE VISIT (OUTPATIENT)
Dept: POSTPARTUM | Facility: CLINIC | Age: 33
End: 2023-10-19

## 2023-10-19 NOTE — PROGRESS NOTES
BREAST FEEDING FOLLOW UP VISIT    Informant/Relationship: Roberta (mom/self), Mike (FOB)     Discussion of General Lactation Issues: Chidi Palafox was born at 42 weeks, he did not latch to the breast in the hospital. He is latching more often to the breast and for longer periods since our visit last week. Family is working on the biological nurturing position and this has been comfortable for them. Baby latches on both side, but will nurse for a longer period on the left side. Infant is 15days old today. Interval Breastfeeding History:    Frequency of breast feeding: waking for feedings 2-3 hours during the day, demand feedings overnight but he is waking every 2-3 hours. Does mother feel breastfeeding is effective: Yes, but only some of the time. This has improved. Does infant appear satisfied after nursing:If no, explain: he will still some cues after nursing sessions, he doesn;t alwaus have very active feedings at the breast   Stooling pattern normal:Yes  Urinating frequently:Yes  Using shield or shells:No    Alternative/Artificial Feedings:   Bottle: Yes, Volufeed and Evenflow Balace + bottle, slow flow nipples. Cup: No  Syringe/Finger: No           Formula Type: Similac Neosure                     Amount: 35 mL             Breast Milk:                      Amount: 35-50 mL            Frequency Q 6-8 bottles in the day, a little more than half the feedings he is getting additional bottles   Elimination Problems: No    80/20 breast milk to formula at this time     Equipment:    Pump            Type: Lansinoh             Frequency of Use: 16 mm, not using any more       Equipment Problems: no      Mom:  Breast: Normal, rounded, closely spaced. Non tender per Mom   Nipple Assessment in General: Normal: elongated/eraser, no discoloration and no damage noted. Mother's Awareness of Feeding Cues                 Recognizes:  Yes                  Verbalizes: Yes  Support System: good family support   History of Breastfeeding: first time breastfeeding   Changes/Stressors/Violence: Baby has improved with feeding duration, but he is still not having consistent full feedings at the breast.   Concerns/Goals: Continue to work on positioning, reassurance of what baby is getting at the breast, discuss how and when to incorporate pumping. Problems with Mom: none today    Physical Exam  Constitutional:       Appearance: Normal appearance. HENT:      Head: Normocephalic. Cardiovascular:      Rate and Rhythm: Normal rate. Musculoskeletal:         General: Normal range of motion. Cervical back: Normal range of motion. Neurological:      General: No focal deficit present. Mental Status: She is alert and oriented to person, place, and time. Skin:     General: Skin is warm and dry. Capillary Refill: Capillary refill takes less than 2 seconds. Psychiatric:         Mood and Affect: Mood normal.         Behavior: Behavior normal.         Thought Content: Thought content normal.         Judgment: Judgment normal.     Infant:  Behaviors: Sleepy  Color: Pink  Birth weight: 2670 g  Current weight: 2600 g     Problems with infant: LPI      General Appearance:  Alert, active, no distress                             Head:  Normocephalic- very slight flattening noted to the right side of his skull , AFOF, sutures opposed. Eyes:  Conjunctiva clear, no drainage                              Ears:  Normally placed, no anomolies                             Nose:  Septum intact, no drainage or erythema                           Mouth:  No lesions, at the roof of his mouth at rest.                     Neck:  Supple, symmetrical, trachea midline                  Respiratory:  No grunting, flaring, retractions, breath sounds clear and equal            Cardiovascular:  Regular rate and rhythm. No murmur. Adequate perfusion/capillary refill.  Femoral pulse present                    Abdomen:   Soft, non-tender, no masses, bowel sounds present, no HSM             Genitourinary:  Normal male, testes descended, no discharge, swelling, or pain, anus patent                          Spine:   No abnormalities noted        Musculoskeletal:  Full range of motion          Skin/Hair/Nails:   Skin warm, dry, and intact, no rashes or abnormal dyspigmentation or lesions                Neurologic:   No abnormal movement, tone appropriate for gestational age     Latch:  Efficiency:               Lips Flanged: Yes              Depth of latch: wide latch observed               Audible Swallow: Yes              Visible Milk: Yes              Wide Open/ Asymmetrical: Yes              Suck Swallow Cycle: Breathing: yes, Coordinated: yes  Nipple Assessment after latch: Normal: elongated/eraser, no discoloration and no damage noted. Latch Problems: Initially Didi Pulido does not draw in the nipple, he gapes, but the tongue lifts up and pushes forward. Demonstrated suck training for the family, baby does latch properly and draw in nipple after this, with some breast shaping. Position:  Infant's Ergonomics/Body               Body Alignment: Yes               Head Supported: Yes               Close to Mom's body/ Lifted/ Supported: Yes               Mom's Ergonomics/Body: Yes                           Supported: Yes                           Sitting Back: Yes                           Brings Baby to her breast: Yes  Positioning Problems: Mom positions Didi Pulido properly in biological nurturing hold. Both appear comfortable. Handouts:   None today    Education:  Reviewed Latch: importance of deep latch without pain. Reviewed Positioning for Dyad: proper alignment and head angle when positioning at the breast   Reviewed Frequency/Supply & Demand: offer the breast at each feeding, pump if baby is not latching and effective transferring milk.    Reviewed Infant:Cues and varied States of Awareness: watch for hunger cues, feed on demand. If baby seems satisfied at the breast (calm, relaxed sleeping, breasts are softer) no need to pump or supplement   Reviewed Infant Elimination: goal of 6+ wets and 2-3 stools per day   Reviewed Alternative/Artificial Feedings: paced bottle feeding technique demonstrated  Reviewed Mom/Breast care: gentle handling of the breast at all times. Reviewed Equipment: Hand pump and electric pump general guidance, Discussed proper flange fit, how to measure        Plan:      Reassurance provided that baby is growing well at this time and family is progressing beautifully with breastfeeding. Cont with positioning adjustments and watch for signs of effective feeding. Pump if wanting to replace feeding at the breast or for "sleepier" feedings. Gentle handling of the breast at all times to preserve integrity. [place]iashire for breastfeeding support as needed or ongoing concerns with latching comfort and milk transfer. I have spent 60 minutes with Patient and family today in which greater than 50% of this time was spent in counseling/coordination of care regarding Patient and family education.

## 2023-10-19 NOTE — PATIENT INSTRUCTIONS
-Watch for signs throughout the feeding that baby is effectively removing milk. You will feel strong tugging, hear swallowing and will feel your breasts get softer after nursing.   -No need to pump if baby is latching and feeding well at the breast on demand.   -Pump only as needed for missed feedings at the breast or for uncomfortable engorgement, utilize flange fit and settings that are comfortable for you, pumping should not be painful!   - Storing and Thawing Breast Milk  [facility] Breastfeeding Support (usda.gov)    -Please call or scheduled a follow up appointment with lactation as needed for questions or concerns you may have.

## 2023-11-01 ENCOUNTER — OFFICE VISIT (OUTPATIENT)
Dept: POSTPARTUM | Facility: CLINIC | Age: 33
End: 2023-11-01

## 2023-11-01 NOTE — PROGRESS NOTES
BREAST FEEDING FOLLOW UP VISIT    Informant/Relationship: Roberta (mom/self), Mike (FOB)     Discussion of General Lactation Issues: Follow up visit. Things overall are improving. Mom wants to work on different positions that can be helpful as baby grows. She also has some questions about how to best burp the baby. Infant is 3 weeks and 11days old today. Interval Breastfeeding History:    Frequency of breast feeding: every 3 h, with some cluster feeding, 10-12 times per days   Does mother feel breastfeeding is effective: Yes  Does infant appear satisfied after nursing:Yes  Stooling pattern normal:Yes  Urinating frequently:Yes  Using shield or shells:No    Alternative/Artificial Feedings:   Bottle: Yes, intermittent bottles. Volufeed when offered   Cup: No  Syringe/Finger: No           Formula Type: no                     Amount: no            Breast Milk:                      Amount: only directly at the breast               Elimination Problems: No      Equipment:    Pump            Type: Lansinoh             Frequency of Use: not pumping often       Equipment Problems: no      Mom:  Breast: Normal, rounded, closely spaced. Non tender per Mom   Nipple Assessment in General: Normal: elongated/eraser, no discoloration and no damage noted. Mother's Awareness of Feeding Cues                 Recognizes: Yes                  Verbalizes: Yes  Support System: good family support   History of Breastfeeding: first time breastfeeding   Changes/Stressors/Violence: General feeding questions, thinking ahead how to prep and continue to do well. Concerns/Goals: Review different holds, discuss how to incorporate pumping and bottle feeding in the future, and review burping technique. Problems with Mom: None today     Physical Exam  Constitutional:       Appearance: Normal appearance. She is normal weight. Pulmonary:      Effort: Pulmonary effort is normal.   Musculoskeletal:         General: Normal range of motion. Cervical back: Normal range of motion. Neurological:      General: No focal deficit present. Mental Status: She is alert and oriented to person, place, and time. Skin:     General: Skin is warm and dry. Capillary Refill: Capillary refill takes less than 2 seconds. Psychiatric:         Mood and Affect: Mood normal.         Behavior: Behavior normal.         Thought Content: Thought content normal.         Judgment: Judgment normal.       Infant:  Behaviors: Alert  Color: Pink  Birth weight: 2670 g  Current weight: 3175 g     Problems with infant: none today       General Appearance:  Alert, active, no distress                             Head:  Normocephalic, AFOF, sutures opposed                             Eyes:  Conjunctiva clear, no drainage                              Ears:  Normally placed, no anomolies                             Nose:  Septum intact, no drainage or erythema                           Mouth:  No lesions, good tip to palate lift, good lateralization, good cupping and peristalsis on gloved finger. Rocker motion when nursing at the breast.                     Neck:  Supple, symmetrical, trachea midline                 Respiratory:  No grunting, flaring, retractions, breath sounds clear and equal            Cardiovascular:  Regular rate and rhythm. No murmur. Adequate perfusion/capillary refill. Femoral pulse present                    Abdomen:   Soft, non-tender, no masses, bowel sounds present, no HSM             Genitourinary:  Normal male, testes descended, no discharge, swelling, or pain, anus patent                          Spine:   No abnormalities noted        Musculoskeletal:  Full range of motion          Skin/Hair/Nails:   Skin warm, dry, and intact, no rashes or abnormal dyspigmentation or lesions                Neurologic:   No abnormal movement, tone appropriate for gestational age    Lentner Latch:  Efficiency:               Lips Flanged:  Yes              Depth of latch: wide              Audible Swallow: Yes              Visible Milk: Yes              Wide Open/ Asymmetrical: Yes              Suck Swallow Cycle: Breathing: yes, Coordinated: yes  Nipple Assessment after latch: Normal: elongated/eraser, no discoloration and no damage noted. Latch Problems: None today     Position:  Infant's Ergonomics/Body               Body Alignment: Yes               Head Supported: Yes               Close to Mom's body/ Lifted/ Supported: Yes               Mom's Ergonomics/Body: Yes                           Supported: Yes                           Sitting Back: Yes                           Brings Baby to her breast: Yes  Positioning Problems: None       Handouts:   None today     Education:  Reviewed Latch: importance of deep latch without pain. Reviewed Positioning for Dyad: proper alignment and head angle when positioning at the breast   Reviewed Frequency/Supply & Demand: offer the breast at each feeding, pump if baby is not latching and effective transferring milk. Reviewed Infant:Cues and varied States of Awareness: watch for hunger cues, feed on demand. If baby seems satisfied at the breast (calm, relaxed sleeping, breasts are softer) no need to pump or supplement   Reviewed Infant Elimination: goal of 6+ wets and 2-3 stools per day   Reviewed Alternative/Artificial Feedings: paced bottle feeding technique demonstrated  Reviewed Mom/Breast care: gentle handling of the breast at all times  Reviewed Equipment: Hand pump and electric pump general guidance, Discussed proper flange fit, how to measure        Plan:     Reassurance and encouragement provided that baby is feeding and growing well, and family is progressing beautifully with breastfeeding. Cont with  good positioning technique and watch for signs of effective feeding. Pump if wanting to replace feeding at the breast with bottle feeding or if latching becomes painful.  Gentle handling of the breast at all times to preserve integrity. [place] for breastfeeding support as needed or ongoing concerns with latching comfort and milk transfer. I have spent 60 minutes with Patient and family today in which greater than 50% of this time was spent in counseling/coordination of care regarding Patient and family education.

## 2023-11-01 NOTE — PATIENT INSTRUCTIONS
-Continue to feed Jamal on demand, and watch for signs that he is effectively feeding.   -No need to pump if he is feeding well at the breast on demand.  -Pump only as needed for missed feedings at the breast or for uncomfortable engorgement.   -Call our center at any time for questions that come up about feedings or pumping, or to schedule a follow up.

## 2023-11-30 ENCOUNTER — POSTPARTUM VISIT (OUTPATIENT)
Dept: OBGYN CLINIC | Facility: CLINIC | Age: 33
End: 2023-11-30

## 2023-11-30 VITALS
HEIGHT: 62 IN | WEIGHT: 178 LBS | DIASTOLIC BLOOD PRESSURE: 70 MMHG | SYSTOLIC BLOOD PRESSURE: 110 MMHG | BODY MASS INDEX: 32.76 KG/M2

## 2023-11-30 DIAGNOSIS — Z30.011 ENCOUNTER FOR INITIAL PRESCRIPTION OF CONTRACEPTIVE PILLS: ICD-10-CM

## 2023-11-30 PROCEDURE — 99024 POSTOP FOLLOW-UP VISIT: CPT | Performed by: OBSTETRICS & GYNECOLOGY

## 2023-11-30 RX ORDER — ACETAMINOPHEN AND CODEINE PHOSPHATE 120; 12 MG/5ML; MG/5ML
1 SOLUTION ORAL DAILY
Qty: 28 TABLET | Refills: 6 | Status: SHIPPED | OUTPATIENT
Start: 2023-11-30 | End: 2024-06-13

## 2023-12-01 NOTE — PROGRESS NOTES
Routine Post Partum Visit  [address] [address], [address], [facility], [address] [facility],[address]    Assessment/Plan:  Amalia Green is a 26y.o. year old  who presents for postpartum visit. Routine Postpartum Care  Normal postpartum exam  Contraception: oral progesterone-only contraceptive  Depression Screen: PPD screen score:  negative  Feeding:  She is breast feeding exclusively. Psychosocial support: present  Cervical cancer screening Up to Date  Follow up in: 3 months for wellness visit, or as needed. Additional Problems:  1. Encounter for initial prescription of contraceptive pills  -     norethindrone (Meron) 0.35 MG tablet; Take 1 tablet (0.35 mg total) by mouth daily        Next visit: 3 months Wellness    Subjective:     CC: Postpartum visit    Michelle Smith is a 26 y.o. y.o. female  who presents for a postpartum visit. She is 6 weeks postpartum following a spontaneous vaginal delivery on 10/6/23 at 36.1 weeks. Outcome: spontaneous vaginal delivery, 2nd degree  Anesthesia: epidural. Postpartum course has been uncomplicated. Baby's course has been uncomplicated. Baby is feeding by She is breast feeding exclusively. .     Bleeding no bleeding. Bowel function is normal. Bladder function is normal. Patient is not sexually active since delivery. Contraception method is oral progesterone-only contraceptive. Postpartum depression screening: negative.     The following portions of the patient's history were reviewed and updated as appropriate: allergies, current medications, past family history, past medical history, obstetric history, gynecologic history, past social history, past surgical history and problem list.      Objective:  /70 (BP Location: Right arm, Patient Position: Sitting, Cuff Size: Standard)   Ht 5' 2" (1.575 m)   Wt 80.7 kg (178 lb)   LMP 2023   Breastfeeding Yes   BMI 32.56 kg/m²   Pregravid Weight/BMI: 73 kg (161 lb) (BMI 29.44)  Current Weight: 80.7 kg (178 lb)   Total Weight Gain: 13.2 kg (29 lb)     General: Well appearing, no distress. Mood and affect: Appropriate.   Abdomen: Soft, nontender  Vulva: normal  Vagina: normal, no abnormal discharge  Cervix: closed, no bleeding  Uterus: non-tender, normal size  Adnexa: no masses, no tenderness

## 2024-01-17 ENCOUNTER — TELEPHONE (OUTPATIENT)
Dept: POSTPARTUM | Facility: CLINIC | Age: 34
End: 2024-01-17

## 2024-01-17 NOTE — TELEPHONE ENCOUNTER
Roberta called - having some difficulties with nursing & would like to discuss over phone 1st.    Supply a concern, seems to have dropped, Jamal (3m) will nurse fine overnight, but during the day it doesn't seem to satisfy him & he gets fussy,  she will then provide expressed breast milk with paced bottle feeding.  She is asking how to help the day time feedings to get better.  This has been happening for the past week or 2 with the daytime feedings.

## 2024-01-17 NOTE — TELEPHONE ENCOUNTER
Roberta reports that Jamal has very fussy periods during feedings during the day. She is waking him after 2 hours if sleeping during the day. But usually does not have to wake him. During the night he sleeps well, sometimes 7-8 hours, for about 1 month. She is giving a bottle before bed, with rice formula for uncomfortable spits, and one other time overnight. Mom is not pumping during the day. She pumps once after he goes to bed.  He will latch calmly but comes off after about 1 minute and gets very fussy, screams, and refuses to re-latch. Trying for a few more minutes but then offers him a bottle which he takes willingly.     She started a progesterone only birth control pill after her 10 week postpartum visit. She states feeding was going well for a few weeks after this.     I reminded Roberta to ensure she is doing paced bottle feeding, allow him to establish sucking on a dry bottle nipple first then gently tilt the nipple down. Feed at the breast as often as possible, offer when he's calm but hungry, I recommend skin to skin for feedings. Increase pumping if possible if he's not latching well and having full feedings at the breast during the day.

## 2024-02-28 ENCOUNTER — PATIENT MESSAGE (OUTPATIENT)
Dept: OBGYN CLINIC | Facility: CLINIC | Age: 34
End: 2024-02-28

## 2024-02-28 DIAGNOSIS — Z30.011 ENCOUNTER FOR INITIAL PRESCRIPTION OF CONTRACEPTIVE PILLS: Primary | ICD-10-CM

## 2024-02-28 RX ORDER — DESOGESTREL AND ETHINYL ESTRADIOL 0.15-0.03
1 KIT ORAL DAILY
Qty: 28 TABLET | Refills: 3 | Status: SHIPPED | OUTPATIENT
Start: 2024-02-28 | End: 2024-06-19

## 2024-03-25 ENCOUNTER — ANNUAL EXAM (OUTPATIENT)
Dept: OBGYN CLINIC | Facility: CLINIC | Age: 34
End: 2024-03-25
Payer: COMMERCIAL

## 2024-03-25 VITALS
BODY MASS INDEX: 29.81 KG/M2 | DIASTOLIC BLOOD PRESSURE: 82 MMHG | WEIGHT: 162 LBS | SYSTOLIC BLOOD PRESSURE: 120 MMHG | HEIGHT: 62 IN

## 2024-03-25 DIAGNOSIS — K64.4 EXTERNAL HEMORRHOID: ICD-10-CM

## 2024-03-25 DIAGNOSIS — Z01.419 ROUTINE GYNECOLOGICAL EXAMINATION: Primary | ICD-10-CM

## 2024-03-25 DIAGNOSIS — F32.A DEPRESSION, UNSPECIFIED DEPRESSION TYPE: ICD-10-CM

## 2024-03-25 PROCEDURE — S0612 ANNUAL GYNECOLOGICAL EXAMINA: HCPCS | Performed by: NURSE PRACTITIONER

## 2024-03-25 RX ORDER — DEXTROAMPHETAMINE SACCHARATE, AMPHETAMINE ASPARTATE, DEXTROAMPHETAMINE SULFATE AND AMPHETAMINE SULFATE 2.5; 2.5; 2.5; 2.5 MG/1; MG/1; MG/1; MG/1
10 TABLET ORAL
COMMUNITY
Start: 2024-02-21

## 2024-03-25 RX ORDER — HYDROCORTISONE 25 MG/G
CREAM TOPICAL 2 TIMES DAILY
Qty: 28 G | Refills: 1 | Status: SHIPPED | OUTPATIENT
Start: 2024-03-25 | End: 2024-04-08

## 2024-03-25 NOTE — PROGRESS NOTES
[facility] - [address], [address], [place] [address]    ASSESSMENT/PLAN: Roberta Nicole is a 33 y.o.  who presents for annual gynecologic exam.    Encounter for routine gynecologic examination  - Routine well woman exam completed today.  - Cervical Cancer Screening: Current ASCCP Guidelines reviewed. Last Pap: 2022 NIL HPV negative. History of abnormal: No  - HPV Vaccination status: Not immunized  - STI screening offered including HIV testing: offered, pt declined  - Contraceptive counseling discussed.  Current contraception: oral contraceptives (estrogen/progesterone)   - The following were reviewed in today's visit: breast self exam, use and side effects of OCPs, adequate intake of calcium and vitamin D, exercise, and healthy diet    Additional problems addressed during this visit:  1. Routine gynecological examination    2. Depression, unspecified depression type  -     sertraline (Zoloft) 50 mg tablet; Take 1 tablet (50 mg total) by mouth daily    3. External hemorrhoid  -     hydrocortisone (ANUSOL-HC) 2.5 % rectal cream; Apply topically 2 (two) times a day for 14 days    Discussed depression, patient interested in medication.  Discussed use, side effects, adverse effects associated with Zoloft.    Discussed emergency care if crisis  Recommend utilizing natural supports, encouraged to verbalize difficulty to partner and others, take time for self care.   Return visit in 1 month and PRN    CC:  Annual Gynecologic Examination    HPI: Roberta Nicole is a 33 y.o.  who presents for annual gynecologic examination. Six month old baby, doing well but has severe reflux, which is overwhelming to patient. States she is the only one who can care for baby,  available but baby prefers her attention. Works from home while also caring for baby.  Not getting time for self care, exercise.  Tearful and sad.  Denies SI/HI.     ROS: Negative except as noted  in HPI    Patient's last menstrual period was 03/11/2024.       She  reports that she is not currently sexually active and has had partner(s) who are male. She reports using the following method of birth control/protection: OCP.       The following portions of the patient's history were reviewed and updated as appropriate:   Past Medical History:   Diagnosis Date    ADHD     managed by PCP, On Adderall until 1/2023    Migraine 1/1/2006    managed with OTC medications    Seasonal allergies      Past Surgical History:   Procedure Laterality Date    OVARIAN CYST REMOVAL Right 2008    ROTATOR CUFF REPAIR Right 2010    WISDOM TOOTH EXTRACTION  2010     Family History   Problem Relation Age of Onset    Breast cancer Maternal Grandmother     Diabetes type II Paternal Grandmother     Diabetes Paternal Grandmother         Type 2 related to weight    Breast cancer Other     Migraines Mother     Cancer Maternal Grandfather         Bladder cancer metasticized, related to  service    Diabetes Maternal Grandfather         Related to  service    Heart disease Maternal Grandfather     Uterine cancer Neg Hx     Ovarian cancer Neg Hx     Colon cancer Neg Hx      Social History     Socioeconomic History    Marital status: /Civil Union     Spouse name: None    Number of children: None    Years of education: None    Highest education level: None   Occupational History    None   Tobacco Use    Smoking status: Never    Smokeless tobacco: Never   Vaping Use    Vaping status: Never Used   Substance and Sexual Activity    Alcohol use: Not Currently     Comment: No alcohol since 1/1/2023, previously averaged 2-3 drinks/wk    Drug use: Never    Sexual activity: Not Currently     Partners: Male     Birth control/protection: OCP   Other Topics Concern    None   Social History Narrative    None     Social Determinants of Health     Financial Resource Strain: Not on file   Food Insecurity: Not on file   Transportation Needs:  "Not on file   Physical Activity: Not on file   Stress: Not on file   Social Connections: Not on file   Intimate Partner Violence: Not on file   Housing Stability: Not on file     Outpatient Medications Marked as Taking for the 3/25/24 encounter (Annual Exam) with JOSE Carroll   Medication    amphetamine-dextroamphetamine (ADDERALL) 10 mg tablet    cetirizine (ZyrTEC) 10 mg tablet    desogestrel-ethinyl estradiol (APRI) 0.15-30 MG-MCG per tablet    hydrocortisone (ANUSOL-HC) 2.5 % rectal cream    Prenatal MV & Min w/FA-DHA (ONE A DAY PRENATAL PO)    sertraline (Zoloft) 50 mg tablet     No Known Allergies        Objective:  /82 (BP Location: Left arm, Patient Position: Sitting, Cuff Size: Standard)   Ht 5' 2\" (1.575 m)   Wt 73.5 kg (162 lb)   LMP 03/11/2024   Breastfeeding No   BMI 29.63 kg/m²        Chaperone present? No    General Appearance: alert and oriented, in no acute distress.   Neck/Thyroid: No thyromegaly, no thyroid nodules.  Respiratory: Unlabored breathing.  Cardiovascular: No peripheral edema.  Abdomen: Soft, non-tender, non-distended, no masses, no rebound or guarding.  Breast Exam: No dimpling, nipple retraction or discharge. No lumps or masses. No axillary or supraclavicular nodes.   Pelvic:       External genitalia: Normal appearance, no abnormal pigmentation, no lesions or masses. Normal Bartholin's and Joanna's.      Urinary system: Urethral meatus normal, bladder non-tender.      Vaginal: normal mucosa without prolapse or lesions. Normal-appearing physiologic discharge.      Cervix: Normal-appearing, well-epithelialized, no gross lesions or masses. No cervical motion tenderness.      Adnexa: No adnexal masses or tenderness noted.      Uterus: Normal-sized, regular contour, midline, mobile, no uterine tenderness.  Extremities: Normal range of motion. Warm, well-perfused, non-tender.   Skin: normal, no rash or abnormalities  Neurologic: alert, oriented x3  Psychiatric: " Appropriate affect, mood stable, cooperative with exam.        JOSE Carroll  3/25/2024 5:16 PM

## 2024-04-29 ENCOUNTER — OFFICE VISIT (OUTPATIENT)
Dept: OBGYN CLINIC | Facility: CLINIC | Age: 34
End: 2024-04-29
Payer: COMMERCIAL

## 2024-04-29 VITALS — SYSTOLIC BLOOD PRESSURE: 110 MMHG | DIASTOLIC BLOOD PRESSURE: 74 MMHG

## 2024-04-29 DIAGNOSIS — Z30.011 ENCOUNTER FOR INITIAL PRESCRIPTION OF CONTRACEPTIVE PILLS: ICD-10-CM

## 2024-04-29 DIAGNOSIS — F32.A DEPRESSION, UNSPECIFIED DEPRESSION TYPE: ICD-10-CM

## 2024-04-29 PROCEDURE — 99213 OFFICE O/P EST LOW 20 MIN: CPT | Performed by: NURSE PRACTITIONER

## 2024-04-29 RX ORDER — DESOGESTREL AND ETHINYL ESTRADIOL 0.15-0.03
1 KIT ORAL DAILY
Qty: 90 TABLET | Refills: 2 | Status: SHIPPED | OUTPATIENT
Start: 2024-04-29

## 2024-04-29 NOTE — PROGRESS NOTES
[facility] - [address], [address], [place] [address]    Assessment/Plan:  1. Encounter for initial prescription of contraceptive pills  -     desogestrel-ethinyl estradiol (APRI) 0.15-30 MG-MCG per tablet; Take 1 tablet by mouth daily Enskyce brand necessary    2. Depression, unspecified depression type  -     sertraline (Zoloft) 50 mg tablet; Take 1 tablet (50 mg total) by mouth daily    Doing better on Zoloft, wants to continue  Discussed very slow weaning of medication to avoid potentially serious side effects should she decide to discontinue.  Call if feels in need of dosage change, or any other concerns.  Return visit 2025 for annual and as needed.    Subjective:   Roberta Nicole is a 33 y.o.  female.  CC: Follow up       HPI:   33 year old  presents for follow up visit after starting medication for depression one month ago. Reports doing much better, has made some changes at home to allow her more time for self care. Well supported by . EPDS 6 today. No SI/HI. Also requests Enskyce brand only for her ocps.        Gyn History  No LMP recorded.       Last pap smear: 2022    She  reports that she is not currently sexually active and has had partner(s) who are male. She reports using the following method of birth control/protection: OCP.       OB History      Past Medical History:  No date: ADHD      Comment:  managed by PCP, On Adderall until 2006: Migraine      Comment:  managed with OTC medications  No date: Seasonal allergies     Past Surgical History:  2008: OVARIAN CYST REMOVAL; Right  2010: ROTATOR CUFF REPAIR; Right  2010: WISDOM TOOTH EXTRACTION     Social History     Tobacco Use    Smoking status: Never    Smokeless tobacco: Never   Vaping Use    Vaping status: Never Used   Substance Use Topics    Alcohol use: Not Currently     Comment: No alcohol since 2023, previously averaged 2-3 drinks/wk    Drug use:  Never          Current Outpatient Medications:     desogestrel-ethinyl estradiol (APRI) 0.15-30 MG-MCG per tablet, Take 1 tablet by mouth daily Enskyce brand necessary, Disp: 90 tablet, Rfl: 2    sertraline (Zoloft) 50 mg tablet, Take 1 tablet (50 mg total) by mouth daily, Disp: 90 tablet, Rfl: 3    acetaminophen (TYLENOL) 325 mg tablet, Take 2 tablets (650 mg total) by mouth every 6 (six) hours as needed (mild pain) (Patient not taking: Reported on 11/1/2023), Disp: , Rfl: 0    amphetamine-dextroamphetamine (ADDERALL) 10 mg tablet, Take 10 mg by mouth, Disp: , Rfl:     cetirizine (ZyrTEC) 10 mg tablet, Take 10 mg by mouth if needed for allergies, Disp: , Rfl:     docusate sodium (COLACE) 100 mg capsule, Take 100 mg by mouth if needed (Patient not taking: Reported on 3/25/2024), Disp: , Rfl:     Ferrous Sulfate (IRON PO), Take by mouth daily (Patient not taking: Reported on 3/25/2024), Disp: , Rfl:     hydrocortisone (ANUSOL-HC) 2.5 % rectal cream, Apply topically 2 (two) times a day for 14 days, Disp: 28 g, Rfl: 1    ibuprofen (MOTRIN) 600 mg tablet, Take 1 tablet (600 mg total) by mouth every 6 (six) hours as needed for mild pain (Patient not taking: Reported on 11/30/2023), Disp: , Rfl: 0    Magnesium 250 MG TABS, Take 1 tablet by mouth Daily at 2am (Patient not taking: Reported on 3/25/2024), Disp: , Rfl:     Prenatal MV & Min w/FA-DHA (ONE A DAY PRENATAL PO), Take by mouth, Disp: , Rfl:     She has No Known Allergies..    ROS: Review of Systems Negative except as noted in HPI    Objective:  /74   Breastfeeding No      Physical Exam  Constitutional:       General: She is not in acute distress.     Appearance: Normal appearance.   Skin:     General: Skin is warm and dry.   Psychiatric:         Mood and Affect: Mood normal.         Thought Content: Thought content normal.         Judgment: Judgment normal.

## 2024-06-17 DIAGNOSIS — F32.A DEPRESSION, UNSPECIFIED DEPRESSION TYPE: ICD-10-CM

## 2024-08-02 DIAGNOSIS — Z00.6 ENCOUNTER FOR EXAMINATION FOR NORMAL COMPARISON OR CONTROL IN CLINICAL RESEARCH PROGRAM: ICD-10-CM

## 2024-08-20 ENCOUNTER — APPOINTMENT (OUTPATIENT)
Dept: LAB | Facility: CLINIC | Age: 34
End: 2024-08-20

## 2024-08-20 DIAGNOSIS — Z00.6 ENCOUNTER FOR EXAMINATION FOR NORMAL COMPARISON OR CONTROL IN CLINICAL RESEARCH PROGRAM: ICD-10-CM

## 2024-08-20 PROCEDURE — 36415 COLL VENOUS BLD VENIPUNCTURE: CPT

## 2024-10-25 LAB
APOB+LDLR+PCSK9 GENE MUT ANL BLD/T: NOT DETECTED
BRCA1+BRCA2 DEL+DUP + FULL MUT ANL BLD/T: NOT DETECTED
MLH1+MSH2+MSH6+PMS2 GN DEL+DUP+FUL M: NOT DETECTED

## 2024-12-14 DIAGNOSIS — F32.A DEPRESSION, UNSPECIFIED DEPRESSION TYPE: ICD-10-CM

## 2025-03-12 ENCOUNTER — NURSE TRIAGE (OUTPATIENT)
Age: 35
End: 2025-03-12

## 2025-03-12 ENCOUNTER — ULTRASOUND (OUTPATIENT)
Dept: OBGYN CLINIC | Facility: CLINIC | Age: 35
End: 2025-03-12
Payer: COMMERCIAL

## 2025-03-12 ENCOUNTER — TELEPHONE (OUTPATIENT)
Age: 35
End: 2025-03-12

## 2025-03-12 VITALS
BODY MASS INDEX: 28.19 KG/M2 | DIASTOLIC BLOOD PRESSURE: 64 MMHG | HEIGHT: 62 IN | WEIGHT: 153.2 LBS | SYSTOLIC BLOOD PRESSURE: 102 MMHG

## 2025-03-12 DIAGNOSIS — O20.9 BLEEDING IN EARLY PREGNANCY: Primary | ICD-10-CM

## 2025-03-12 PROBLEM — O09.519 ADVANCED MATERNAL AGE, PRIMIGRAVIDA, ANTEPARTUM: Status: RESOLVED | Noted: 2023-03-28 | Resolved: 2025-03-12

## 2025-03-12 PROBLEM — Z3A.35 35 WEEKS GESTATION OF PREGNANCY: Status: RESOLVED | Noted: 2023-03-28 | Resolved: 2025-03-12

## 2025-03-12 PROBLEM — O43.199 BILOBED PLACENTA: Status: RESOLVED | Noted: 2023-07-18 | Resolved: 2025-03-12

## 2025-03-12 PROBLEM — Z3A.35 35 WEEKS GESTATION OF PREGNANCY: Status: RESOLVED | Noted: 2023-09-29 | Resolved: 2025-03-12

## 2025-03-12 PROCEDURE — 99213 OFFICE O/P EST LOW 20 MIN: CPT | Performed by: PHYSICIAN ASSISTANT

## 2025-03-12 NOTE — PROGRESS NOTES
"Routine Prenatal Visit  [facility] - [place]  [address] [place] [address]    Assessment/Plan:  Roberta is a 34 y.o. year old  at Unknown who presents for bleeding in early pregnancy.     1. Bleeding in early pregnancy  Assessment & Plan:  Reviewed findings concerning for early pregnancy loss. Reviewed cannot rule out ectopic pregnancy. Precautions given. Had quant done this am. Will continue to trend.   Offered patient support, dispelled guilt. Information for loss support group and baby and me counselor given.   Reviewed options with patient including allowing tissues to pass on own, cytotec, or D&E procedure. At this time tissues appear to be passing on their own. Patient ok to continue expectant management. Reviewed what to expect as far as bleeding and pain. Aware tylenol or Ibuprofen for pain relief and pads, no tampons. Aware to s/s to go to ER or call with.   Office will reach out with follow up blood work and appropriate follow up.     Orders:  -     hCG, quantitative; Future  -     hCG, quantitative        Subjective:     CC: Bleeding in early pregnancy    Roberta Nicole is a 34 y.o.  female who presents for bleeding in early pregnancy at 5w5d.   LMP: 2025. Positive pregnancy test 2 weeks ago. Stopped pill 2 months ago.     Started with light brown discharge last night. This morning started with heavier menstrual like bleeding with clotting. Mild menstrual like cramps. No unilateral pain.   Patient had labs done with PCP yesterday that showed quant 64. Patient is A positive blood type.     The following portions of the patient's history were reviewed and updated as appropriate: allergies, current medications, past family history, past medical history, obstetric history, gynecologic history, past social history, past surgical history and problem list.      Objective:  /64 (BP Location: Left arm, Patient Position: Sitting, Cuff Size: Standard)   Ht 5' 2\" " (1.575 m)   Wt 69.5 kg (153 lb 3.2 oz)   LMP 2025 (Exact Date)   BMI 28.02 kg/m²   Pregravid Weight/BMI: No episode found (BMI Could not be calculated)  Current Weight: 69.5 kg (153 lb 3.2 oz)   Total Weight Gain: Not found.   Pre-Hever Vitals      Flowsheet Row Most Recent Value   Prenatal Assessment    Prenatal Vitals    Blood Pressure 102/64   Weight - Scale 69.5 kg (153 lb 3.2 oz)   Urine Albumin/Glucose    Dilation/Effacement/Station    Vaginal Drainage    Edema           FIRST TRIMESTER OBSTETRIC ULTRASOUND  Date of study: 3/12/2025  Performed by: Sheyla Potts PA-C     INDICATION: Amenorrhea, viability    COMPARISON: None.     TECHNIQUE:   Transvaginal imaging was performed to assess the gestation, myometrial/endometrial architecture and ovarian parenchymal detail.    The study includes volumetric sweeps and traditional still imaging technique.      FINDINGS:     Endometrial stripe of 6.2mm with no gestational sac.        UTERUS/ADNEXA:   No adnexal mass or pathologic cyst.  No free fluid identified.     IMPRESSION:    No IUP identified.   Concerning for early pregnancy loss vs ectopic pregnancy.   Will continue to follow quants. Ectopic precautions given.     Sheyla Potts PA-C  3/12/2025 3:11 PM

## 2025-03-12 NOTE — ASSESSMENT & PLAN NOTE
Reviewed findings concerning for early pregnancy loss. Reviewed cannot rule out ectopic pregnancy. Precautions given. Had quant done this am. Will continue to trend.   Offered patient support, dispelled guilt. Information for loss support group and baby and me counselor given.   Reviewed options with patient including allowing tissues to pass on own, cytotec, or D&E procedure. At this time tissues appear to be passing on their own. Patient ok to continue expectant management. Reviewed what to expect as far as bleeding and pain. Aware tylenol or Ibuprofen for pain relief and pads, no tampons. Aware to s/s to go to ER or call with.   Office will reach out with follow up blood work and appropriate follow up.

## 2025-03-12 NOTE — TELEPHONE ENCOUNTER
"FOLLOW UP: Recommendations per Dr. Lemus: Labs ordered for completion prior to appointment. Appointment scheduled for today, 3/12.    REASON FOR CONVERSATION: Vaginal Bleeding - Pregnant    SYMPTOMS: vaginal bleeding, abdominal cramping    OTHER: OB 5w5d by LMP  with vaginal bleeding that started last night. Bleeding described as heavy period - accumulating on pad, bright red in color, several long and thin clots. Is having abdominal pain, rates 3/10. Denies feeling lightheaded, dizzy. D&V is currently scheduled for 3/28.     ESC sent to Dr. Lemus.     DISPOSITION: Discuss with Provider and Call Back Patient      Answer Assessment - Initial Assessment Questions  1. ONSET: \"When did this bleeding start?\"        3/11 evening  2. BLEEDING SEVERITY: \"Describe the bleeding that you are having.\" \"How much bleeding is there?\"       Like heavy period, accumulating on pad and long thin clots  3. ABDOMEN PAIN: \"Do you have any pain?\" \"How bad is the pain?\"  (e.g., Scale 0-10; none, mild, moderate, or severe)      3/10  4. PREGNANCY: \"Do you know how many weeks or months pregnant you are?\" \"When was the first day of your last normal menstrual period?\"      5w5d by LMP  5. ULTRASOUND: \"Have you had an ultrasound during this pregnancy?\"  Note: To confirm intrauterine pregnancy, placenta location.      Has not had ultrasound  6. HEMODYNAMIC STATUS: \"Are you weak or feeling lightheaded?\" If Yes, ask: \"Can you stand and walk normally?\"       denies  7. OTHER SYMPTOMS: \"What other symptoms are you having with the bleeding?\" (e.g., passed tissue, vaginal discharge, fever, menstrual-type cramps)      denies    Protocols used: Pregnancy - Vaginal Bleeding Less Than 20 Weeks EGA-Adult-[place]    "

## 2025-03-12 NOTE — TELEPHONE ENCOUNTER
Patient is currently at [facility]. Need lab orders for hCG, quantitative, ABO/RH, and CBC faxed over to [phone]

## 2025-03-13 ENCOUNTER — RESULTS FOLLOW-UP (OUTPATIENT)
Dept: LABOR AND DELIVERY | Facility: HOSPITAL | Age: 35
End: 2025-03-13

## 2025-03-13 DIAGNOSIS — O20.9 BLEEDING IN EARLY PREGNANCY: Primary | ICD-10-CM

## 2025-03-13 LAB
ABO GROUP BLD: NORMAL
B-HCG SERPL-ACNC: 72 MIU/ML
ERYTHROCYTE [DISTWIDTH] IN BLOOD BY AUTOMATED COUNT: 12.1 % (ref 11–15)
HCT VFR BLD AUTO: 40.2 % (ref 35–45)
HGB BLD-MCNC: 13.5 G/DL (ref 11.7–15.5)
MCH RBC QN AUTO: 29.8 PG (ref 27–33)
MCHC RBC AUTO-ENTMCNC: 33.6 G/DL (ref 32–36)
MCV RBC AUTO: 88.7 FL (ref 80–100)
PLATELET # BLD AUTO: 293 THOUSAND/UL (ref 140–400)
PMV BLD REES-ECKER: 10.1 FL (ref 7.5–12.5)
RBC # BLD AUTO: 4.53 MILLION/UL (ref 3.8–5.1)
RH BLD: NORMAL
WBC # BLD AUTO: 5.3 THOUSAND/UL (ref 3.8–10.8)

## 2025-03-14 ENCOUNTER — RESULTS FOLLOW-UP (OUTPATIENT)
Dept: OBGYN CLINIC | Facility: CLINIC | Age: 35
End: 2025-03-14

## 2025-03-14 DIAGNOSIS — O03.9 EARLY PREGNANCY LOSS: Primary | ICD-10-CM

## 2025-03-14 LAB — B-HCG SERPL-ACNC: 13 MIU/ML

## 2025-03-26 LAB — B-HCG SERPL-ACNC: <5 MIU/ML

## 2025-04-29 ENCOUNTER — ANNUAL EXAM (OUTPATIENT)
Dept: OBGYN CLINIC | Facility: CLINIC | Age: 35
End: 2025-04-29
Payer: COMMERCIAL

## 2025-04-29 VITALS
WEIGHT: 148 LBS | HEIGHT: 62 IN | SYSTOLIC BLOOD PRESSURE: 98 MMHG | BODY MASS INDEX: 27.23 KG/M2 | DIASTOLIC BLOOD PRESSURE: 60 MMHG

## 2025-04-29 DIAGNOSIS — Z01.419 ROUTINE GYNECOLOGICAL EXAMINATION: Primary | ICD-10-CM

## 2025-04-29 PROBLEM — O20.9 BLEEDING IN EARLY PREGNANCY: Status: RESOLVED | Noted: 2025-03-12 | Resolved: 2025-04-29

## 2025-04-29 PROCEDURE — 99395 PREV VISIT EST AGE 18-39: CPT | Performed by: PHYSICIAN ASSISTANT

## 2025-04-29 RX ORDER — DEXTROAMPHETAMINE SACCHARATE, AMPHETAMINE ASPARTATE MONOHYDRATE, DEXTROAMPHETAMINE SULFATE AND AMPHETAMINE SULFATE 5; 5; 5; 5 MG/1; MG/1; MG/1; MG/1
20 CAPSULE, EXTENDED RELEASE ORAL
COMMUNITY
Start: 2025-04-14

## 2025-04-29 RX ORDER — ALBUTEROL SULFATE 90 UG/1
2 INHALANT RESPIRATORY (INHALATION) EVERY 6 HOURS PRN
COMMUNITY
Start: 2024-11-05 | End: 2025-11-05

## 2025-04-29 NOTE — PROGRESS NOTES
[facility] - [address], [address], [place] [address]    ASSESSMENT/PLAN: Roberta Nicole is a 34 y.o.  who presents for annual gynecologic exam.    Encounter for routine gynecologic examination  - Routine well woman exam completed today.  - Cervical Cancer Screening: Current ASCCP Guidelines reviewed. Last Pap: 2022 NILM. History of abnormal: no  - HPV Vaccination status: Immunization series complete  - STI screening offered including HIV testing: offered, pt declined  - Contraceptive counseling discussed.  Current contraception: trying to conceive:     Additional problems addressed during this visit:  1. Routine gynecological examination  Assessment & Plan:  Pap guidelines reviewed. Pap with HPV done today.   Reviewed recommendation to start a prenatal vitamin with folic acid 400-800mcg 1-3 months prior to conception to decrease risk of neural tube defects such as spina bifida.   If trying for 6 months without success recommend follow up with reproductive endocrinology.   Return to office for annual or as needed.   Orders:  -     Thinprep Tis Pap and HPV mRNA E6/E7 Reflex HPV 16,18/45      CC:  Annual Gynecologic Examination    HPI: Roberta Nicole is a 34 y.o.  who presents for annual gynecologic examination. Recent pregnancy loss in 3/2025, reports had menses 2025, reports more cramping but otherwise ok.   Trying to conceive.   Denies bowel or bladder issues.     ROS: Negative except as noted in HPI    Patient's last menstrual period was 2025 (exact date).       She  reports being sexually active and has had partner(s) who are male.       The following portions of the patient's history were reviewed and updated as appropriate:   Past Medical History:   Diagnosis Date    ADHD     managed by PCP, On Adderall until 2023    Migraine 2006    managed with OTC medications    Seasonal allergies     Varicella     Childhood     Past  Surgical History:   Procedure Laterality Date    MYOMECTOMY  6/2007    Ovarian cyst removed    OVARIAN CYST REMOVAL Right 2008    ROTATOR CUFF REPAIR Right 2010    WISDOM TOOTH EXTRACTION  2010     Family History   Problem Relation Age of Onset    Breast cancer Maternal Grandmother     Diabetes type II Paternal Grandmother     Diabetes Paternal Grandmother         Type 2 related to weight    Breast cancer Other     Migraines Mother     Cancer Maternal Grandfather         Bladder cancer metasticized, related to  service    Diabetes Maternal Grandfather         Related to  service    Heart disease Maternal Grandfather     Uterine cancer Neg Hx     Ovarian cancer Neg Hx     Colon cancer Neg Hx      Social History     Socioeconomic History    Marital status: /Civil Union     Spouse name: None    Number of children: None    Years of education: None    Highest education level: None   Occupational History    None   Tobacco Use    Smoking status: Never    Smokeless tobacco: Never   Vaping Use    Vaping status: Never Used   Substance and Sexual Activity    Alcohol use: Not Currently     Comment: No alcohol since 1/1/2023, previously averaged 2-3 drinks/wk    Drug use: Never    Sexual activity: Yes     Partners: Male   Other Topics Concern    None   Social History Narrative    None     Social Drivers of Health     Financial Resource Strain: Not on file   Food Insecurity: Not on file   Transportation Needs: Not on file   Physical Activity: Not on file   Stress: Not on file   Social Connections: Not on file   Intimate Partner Violence: Not on file   Housing Stability: Not on file     Outpatient Medications Marked as Taking for the 4/29/25 encounter (Annual Exam) with Sheyla Potts PA-C   Medication    albuterol (PROVENTIL HFA,VENTOLIN HFA) 90 mcg/act inhaler    amphetamine-dextroamphetamine (ADDERALL XR) 20 MG 24 hr capsule    cetirizine (ZyrTEC) 10 mg tablet    docusate sodium (COLACE) 100 mg  "capsule    Prenatal MV & Min w/FA-DHA (ONE A DAY PRENATAL PO)     No Known Allergies        Objective:  BP 98/60 (BP Location: Left arm, Patient Position: Sitting, Cuff Size: Standard)   Ht 5' 2\" (1.575 m)   Wt 67.1 kg (148 lb)   LMP 04/13/2025 (Exact Date)   Breastfeeding No   BMI 27.07 kg/m²        Chaperone present? Yes: Josephine PERKINS Student .     Physical Exam  Constitutional:       Appearance: Normal appearance. She is well-developed.   Genitourinary:      Vulva and bladder normal.      No lesions in the vagina.      Right Labia: No rash, tenderness, lesions or skin changes.     Left Labia: No tenderness, lesions, skin changes or rash.     No labial fusion noted.      No inguinal adenopathy present in the right or left side.     No vaginal discharge, erythema, tenderness or bleeding.        Right Adnexa: not tender, not full and no mass present.     Left Adnexa: not tender, not full and no mass present.     No cervical motion tenderness, discharge or lesion.      Uterus is not enlarged, tender or irregular.      No uterine mass detected.     No urethral prolapse, tenderness or mass present.      Bladder is not tender.    Breasts:     Breasts are symmetrical.      Right: No swelling, bleeding, inverted nipple, mass, nipple discharge, skin change or tenderness.      Left: No swelling, bleeding, inverted nipple, mass, nipple discharge, skin change or tenderness.   HENT:      Head: Normocephalic and atraumatic.   Neck:      Thyroid: No thyromegaly.   Cardiovascular:      Rate and Rhythm: Normal rate and regular rhythm.      Heart sounds: Normal heart sounds. No murmur heard.     No friction rub. No gallop.   Pulmonary:      Effort: Pulmonary effort is normal. No respiratory distress.      Breath sounds: Normal breath sounds. No wheezing.   Abdominal:      General: There is no distension.      Palpations: Abdomen is soft. There is no mass.      Tenderness: There is no abdominal tenderness. There is no " guarding or rebound.      Hernia: No hernia is present.   Lymphadenopathy:      Cervical: No cervical adenopathy.      Upper Body:      Right upper body: No supraclavicular, axillary or pectoral adenopathy.      Left upper body: No supraclavicular, axillary or pectoral adenopathy.      Lower Body: No right inguinal adenopathy. No left inguinal adenopathy.   Neurological:      Mental Status: She is alert and oriented to person, place, and time.   Skin:     General: Skin is warm and dry.   Psychiatric:         Behavior: Behavior normal.             Sheyla Potts PA-C  4/29/2025 10:38 AM

## 2025-05-02 LAB
CLINICAL INFO: NORMAL
CYTO CVX: NORMAL
CYTOLOGY CMNT CVX/VAG CYTO-IMP: NORMAL
DATE PREVIOUS BX: NORMAL
HPV E6+E7 MRNA CVX QL NAA+PROBE: NOT DETECTED
LMP START DATE: NORMAL
SL AMB PREV. PAP:: NORMAL
SPECIMEN SOURCE CVX/VAG CYTO: NORMAL

## 2025-05-05 ENCOUNTER — RESULTS FOLLOW-UP (OUTPATIENT)
Dept: OBGYN CLINIC | Facility: CLINIC | Age: 35
End: 2025-05-05

## 2025-05-29 PROBLEM — Z01.419 ROUTINE GYNECOLOGICAL EXAMINATION: Status: RESOLVED | Noted: 2025-04-29 | Resolved: 2025-05-29

## 2025-06-02 ENCOUNTER — TELEPHONE (OUTPATIENT)
Age: 35
End: 2025-06-02

## 2025-06-02 DIAGNOSIS — Z32.01 POSITIVE PREGNANCY TEST: Primary | ICD-10-CM

## 2025-06-02 NOTE — TELEPHONE ENCOUNTER
Jefry Tenorio was contacted by Ashley Matias MA, a Community Health Navigator, regarding a Social Determinants of Health referral.     A message was left with the writer's contact information.  [phone]    Second follow-up attempt.     Pt was told to come in sooner due to hx of loss. Pt schedule based on LMP: 05/06/25. Please advise if she needs a sooner appt

## 2025-06-03 LAB — B-HCG SERPL-ACNC: 94 MIU/ML

## 2025-06-04 ENCOUNTER — RESULTS FOLLOW-UP (OUTPATIENT)
Dept: OBGYN CLINIC | Facility: CLINIC | Age: 35
End: 2025-06-04

## 2025-06-04 DIAGNOSIS — Z32.01 POSITIVE PREGNANCY TEST: Primary | ICD-10-CM

## 2025-06-05 LAB — B-HCG SERPL-ACNC: 173 MIU/ML

## 2025-06-09 LAB — B-HCG SERPL-ACNC: 690 MIU/ML

## 2025-07-02 ENCOUNTER — ULTRASOUND (OUTPATIENT)
Dept: OBGYN CLINIC | Facility: CLINIC | Age: 35
End: 2025-07-02
Payer: COMMERCIAL

## 2025-07-02 VITALS
DIASTOLIC BLOOD PRESSURE: 66 MMHG | HEIGHT: 62 IN | WEIGHT: 157 LBS | SYSTOLIC BLOOD PRESSURE: 126 MMHG | BODY MASS INDEX: 28.89 KG/M2

## 2025-07-02 DIAGNOSIS — Z3A.08 8 WEEKS GESTATION OF PREGNANCY: ICD-10-CM

## 2025-07-02 DIAGNOSIS — Z32.01 PREGNANCY EXAMINATION OR TEST, POSITIVE RESULT: Primary | ICD-10-CM

## 2025-07-02 DIAGNOSIS — O09.521 MULTIGRAVIDA OF ADVANCED MATERNAL AGE IN FIRST TRIMESTER: ICD-10-CM

## 2025-07-02 PROCEDURE — 99213 OFFICE O/P EST LOW 20 MIN: CPT | Performed by: OBSTETRICS & GYNECOLOGY

## 2025-07-02 PROCEDURE — 76817 TRANSVAGINAL US OBSTETRIC: CPT | Performed by: OBSTETRICS & GYNECOLOGY

## 2025-07-02 RX ORDER — MAGNESIUM L-LACTATE 84 MG
84 TABLET, EXTENDED RELEASE ORAL DAILY
COMMUNITY

## 2025-07-02 NOTE — PROGRESS NOTES
Ultrasound Probe Disinfection    A transvaginal ultrasound was performed.   Prior to use, disinfection was performed with High Level Disinfection Process (Trophon)  Probe serial number [id number]:  [id number] was used    Dara Palladino, MA  07/02/25  4:52 PM

## 2025-07-13 PROBLEM — O09.521 MULTIGRAVIDA OF ADVANCED MATERNAL AGE IN FIRST TRIMESTER: Status: ACTIVE | Noted: 2025-07-02

## 2025-07-13 PROBLEM — Z3A.08 8 WEEKS GESTATION OF PREGNANCY: Status: ACTIVE | Noted: 2025-07-02

## 2025-07-13 NOTE — PROGRESS NOTES
"Name: Roberta Nicole      : 1990      MRN: [id number]  Encounter Provider: Alonzo Antoine MD  Encounter Date: 2025   Encounter department: [facility] [place]  :  Assessment & Plan  Pregnancy examination or test, positive result    35 y.o.  presenting with missed menses.  Viable pregnancy 8w1d by LMP consistent with ultrasound today.  - Continue/start prenatal vitamin  - We reviewed her current medications and discussed which are safe to continue in pregnancy  - We briefly discussed options for aneuploidy screening, to be discussed further at the prenatal intake  - Schedule prenatal intake with RN and initial prenatal visit; prenatal labs will be ordered during the prenatal intake      Transvaginal Pelvic Ultrasound  Negron IUP  Yolk sac: Present  Fetal Pole: Present  CRL consistent with EGA of 8w1d  Cardiac activity: Present   bpm  No adnexal masses appreciated    Orders:    AMB US OB < 14 weeks single or first gestation level 1    Multigravida of advanced maternal age in first trimester    Orders:    Ambulatory Referral to Maternal Fetal Medicine; Future    8 weeks gestation of pregnancy    Orders:    Ambulatory Referral to Maternal Fetal Medicine; Future        History of Present Illness   HPI  Roberta Nicole is a 35 y.o. female who presents with a missed menses and a positive serum pregnancy test  Patient notes that this pregnancy was planned and desired.  She was not using contraception at the time of conception. She reports she is certain of her LMP and that she has regular menses. She has has no vaginal bleeding since her LMP.    History obtained from: patient    Review of Systems       Objective   /66 (BP Location: Right arm, Patient Position: Sitting, Cuff Size: Standard)   Ht 5' 2\" (1.575 m)   Wt 71.2 kg (157 lb)   LMP 2025   Breastfeeding No   BMI 28.72 kg/m²            "

## 2025-07-16 NOTE — PATIENT INSTRUCTIONS
Umberto Nicole,     It was so nice getting to know you today. Remember if you have an urgent or time sensitive concern, please call the practice phone number so a clinical triage team member can review your symptoms and get in touch with our on call provider if necessary. If you have general questions or need help navigating our services please REPLY to this message so it comes directly to me and I will respond between other patients. You can also find me in your CARE TEAM in [facility] and send non-urgent questions that way. If I am out of the office for any reason, another nurse navigator may reach out to help you. Our Pregnancy Essential Guide is a great online resource--please use the link below.     [facility] Pregnancy Essentials Guide  [facility] ([email or web address])     Congratulations on your pregnancy!  We thank you for allowing us to participate in your care.    NEXT STEPS    Go to the lab to have your prenatal blood work competed, if you have not already done so.  We ask that you have this blood work done prior to your first routine prenatal appointment.  If you are going to a [facility] lab, the lab will be able to view the orders in our computer system.  You do not need to take the orders with you.       There is a listing of [facility] Laboratories and locations in your prenatal folder. I also attached a lab location link below where you can find additional information including the hours for each site.  Please be aware that some insurance companies may require you to go to a specific lab (ex. [facility] or [facility]). You can verify this by contacting your insurance company. Please inform me if you plan to go to a lab other than [facility] as some of the order codes may be different.   If you are interested in optional carrier screening for Cystic Fibrosis and Spinal Muscular Atrophy, cost will be based on your medical coverage, deductible, and co-insurance. These tests are processed  through [email or web address] and billing is done directly with [employer] and your insurance company.  If you have additional questions please reach out to [employer] directly at [phone].  When checking coverage, you will be asked for the following CPT codes, CF is 70138 and SMA is 94261.     A referral was placed to Maternal Fetal Medicine for an ultrasound and or genetic testing.  You may have already scheduled or have had this appointment.  If not, please call their office to schedule.  Based on the referral placed by our office, they will know how to schedule you appropriately.    The phone number for Maternal Fetal Medicine is [phone]. For additional detailed contact information for Maternal Fetal Medicine, please refer to the prenatal folder provided to you by our office.     Return to our office for your routine prenatal appointments. Routinely, you will be seen once a month until you are 28 weeks, then twice a month until you are 36 weeks, and then weekly until you deliver.  Additional appointments may be added as needed. Routine prenatal care is essential for your health and the health of your baby.  If you miss an appointment, please contact the office to reschedule.    Please be aware that [facility] has multiple locations. Always check the address of your appointment to ensure you are going to the correct office.     [facility] should only be used for non-urgent concerns or questions.  If you have a medical problem or urgent concern, always call the office directly.        Warning Signs During Pregnancy     The list below includes warning signs your providers would like you to be aware of.  If you experience any of these at any time during your pregnancy, please call us as soon as possible.    Vaginal bleeding   Sharp abdominal pain that does not go away   Fever (more than 100.4?F and is not relieved with Tylenol)   Persistent vomiting lasting greater than 24 hours   Chest pain/Shortness of breath   Pain or  burning when you urinate     Call the OFFICE at [phone] for any questions/emergencies.  At night or on the weekend, calls go through a triage service, please indicate it is an emergency and the DOCTOR on call will be paged.    Our doctors deliver at [facility] in [place]. The address is provided below.     [facility]  [address] [place]  [address]     Please click on the links below to review our Pregnancy Essential Guide.    [facility] Pregnancy Essentials Guide  [facility] ([email or web address])     [facility] - Virtual Tour ([employer])      To learn more about the Prenatal Education classes that [facility] offers, click the link below.  Prenatal Education Classes    Click on the link below to review [facility] Lab locations.  [facility] Lab Locations    [employer] resource  [employer] is a tool to connect you to community resources you may need.      Thank you,   Turner MATOS, RN  OB Nurse Navigator

## 2025-07-16 NOTE — PROGRESS NOTES
"The patient was identified by name and date of birth. (Roberta Nicole,  1990)) was informed that this is a telemedicine nurse OB intake visit and patient was informed that this is a secure, HIPAA-compliant platform. She agrees to proceed.  My office door was closed. No one else was in the room. She acknowledged consent and understanding of privacy and security of the platform. The patient has agreed to participate and understands they can discontinue the visit at any time.     I spent 54 minutes with the patient during this visit.  OB INTAKE INTERVIEW  Patient is @age@ who presents for OB intake at 10.2wks  She is accompanied by herself during this encounter  The father of her baby (Mike) is involved in the pregnancy and is 41 years old.      Last Menstrual Period: 25  Reports she stopped the birth control January, had miscarriage in 3/2025 and took awhile for cycles to regulae. Had a normal cycle in April and May.     Ultrasound: Measured 8 weeks 0 days on 25  Estimated Date of Delivery: 2/10/2026, assigned by Dr. Antoine   Based on Ultrasound done 25. CRL consistent with EGA of 8w1d     Signs/Symptoms of Pregnancy  Current pregnancy symptoms:  Nausea no vomiting-YES, relieved with small frequent meals and williams candy with B6.   Recommended to try to eat 5-6 small meals a day, increase protein with meals/snacks, to keep stomach full. Try bland foods like plain crackers, toast as well as carbonated drinks like gingerale. Hard peppermint or williams candy is a natural treatment for nausea, B- pops with B6 (available at the pharmacy), B6 25 mg in the AM and 25 mg in PM. May combine with Unisom 12.5 mg at night. Unisom may cause drowsiness.  High complex carbohydrate snack before bedtime.  Constipation YES, taking colace   Headaches no had early in pregnancy but they have since improved.   Recommended Tylenol with sips of caffeine for recurrence of HA.   Cramping/spotting YES, \"occasional " "mild cramping, no spotting.\"   PICA cravings no  History of \"Charley horse\" in prior pregnancy- -started taking Magnesium supplement 250 mg daily, as this was helpful in her last pregnancy.   Diabetes-  @bmi@  If patient has 1 or more, please order early 1 hour GTT  History of GDM no  BMI >35 no  History of PCOS or current metformin use (should stop for 7 days prior to 1hr GTT unless pre-existing diabetes)  no  History of LGA/macrosomic infant (4000g/9lbs) no    If patient has 2 or more, please order early 1-hour GTT  BMI>30 no  AMA YES  First degree relative with type 2 diabetes no  History of chronic HTN, hyperlipidemia, elevated A1C no  High risk race (, , ,  or ) no    Hypertension- if you answer yes to any of the following, please order baseline preeclampsia labs (cbc, comprehensive metabolic panel, urine protein creatinine ratio, uric acid)  History of chronic HTN no  History of gestational HTN no  History of preeclampsia, eclampsia, or HELLP syndrome no  History of diabetes no  History of lupus, Sjogren's syndrome, kidney disease no    Thyroid- if yes order TSH with reflex T4  History of thyroid disease no    Bleeding Disorder or Hx of DVT-patient or first degree relative with history of. Order the following if not done previously.   (Factor V, antithrombin III, prothrombin gene mutation, protein C and S Ag, lupus anticoagulant, anticardiolipin, beta-2 glycoprotein)   no    OB/GYN-  History of abnormal pap smear no    Date of last pap smear 25-NILM/Neg HPV  History of HPV no  History of Herpes/HSV no  History of other STI (gonorrhea, chlamydia, trich) no  History of prior  YES  History of prior  no  History of  delivery prior to 36 weeks 6 days YES, delivered at 36w1d 10/26/23.  History of blood transfusion no  Ok for blood transfusion YES    Substance screening-   History of tobacco use no  Currently using tobacco no  Substance " Use Screen Level No Risk     MRSA Screening-   Does the pt have a hx of MRSA? no    Immunizations:  Influenza vaccine given this season no  Discussed Tdap vaccine yes  Discussed COVID Vaccine yes  Reviewed Abrysvo Status YES - given between 32-36 weeks offered September 1 through January 31  Reviewed MMR Status YES  History of Varicella or Vaccination Reports she had chicken pox as a child         Heathy Behaviors:  Dietary Precautions discussed: YES  Travel Precautions discussed: YES-Has travel plans August 2025-[place]    Car: Seat belt safety discussed: YES  Exercise restrictions discussed: YES   Toxoplasmosis precautions reviewed: YES    Genetic/MFM-  Do you or your partner have a history of any of the following in yourselves or first-degree relatives?  Cystic fibrosis no  Spinal muscular atrophy no  Hemoglobinopathy/Sickle Cell/Thalassemia no  Fragile X Intellectual Disability no    If yes, discuss Carrier Screening and recommend consultation with MFM/Genetic Counseling and place specific [facility] Referral for.    If no, discuss Carrier Screening being completed once in a lifetime as a standard of care lab test. Place orders for Cystic Fibrosis Gene Test ([id number]) and Spinal Muscular Atrophy DNA ([id number])  Pt declined CF/SMA carrier screening     Appointment for Nuchal Translucency Ultrasound at [facility] scheduled for 8/6/25      Interview education  [facility] Pregnancy Essentials Book reviewed, discussed and attached to their AVS yes    Nurse/Family Partnership-N/A  Ambulatory Referral to  placed-N/A  EPDS: 3 done 7/12/25    Prenatal lab work scripts YES  Preferred Lab: [facility]   Extra labs ordered  None     Aspirin/Preeclampsia Screen  Risk Level Risk Factor Recommendation   LOW Prior Uncomplicated full-term delivery YES No Aspirin recommendation        MODERATE Nulliparity no Recommend low-dose aspirin if     BMI>30 no 2 or more moderate risk factors    Family History Preeclampsia (mother/sister) no      35yr old or greater YES     Black Race, Concern for SDOH/Low Socioeconomic no     IVF Pregnancy  no     Personal History Risks (low birth weight, prior adverse preg outcome, >10yr preg interval) no         HIGH History of Preeclampsia no Recommend low-dose aspirin if     Multifetal gestation no 1 or more high risk factors    Chronic HTN no     Type 1 or 2 Diabetes no     Renal Disease no     Autoimmune Disease  no          Contraindications to ASA therapy:  NSAID/ ASA allergy: no  Asthma with history of ASA induced bronchospasm: no  Relative contraindications:  History of GI bleed: no  Active peptic ulcer disease: no  Severe hepatic dysfunction: no     Patient should be recommended to take ASA 162mg during this pregnancy from 12-36 weeks to lower her risk of preeclampsia:   Low Risk -Risk factor includes AMA.       The patient has a history now or in prior pregnancy notable for:  See Below       Details that I feel the provider should be aware of:   Roberta is a current patient of the practice. She presents to establish prenatal care.  This is a planned and welcomed pregnancy. This is her third pregnancy. Her pregnancy history includes a Pre-term uncomplicated  at 36w1d in  with a 2nd degree laceration repair and a miscarriage 2025.   Hx of Pre-term delivery   AMA  Medical History includes:  Migraines without Aura-Previously treated with Excedrin prior to pregnancy.   ADHD-Prescribed Adderall through PCP, stopped taking 3/2025  GERD- denies nay current issues. Directed to safe OTC list if having issues as pregnancy progresses.        Prenatal teaching completed, NEEDS Blue folder provided, please   PN1 visit scheduled. The patient was oriented to our practice, the navigator role, reviewed delivering physicians and [facility] for Delivery. All questions were answered.    Interviewed by: MAUREEN Gamez RN

## 2025-07-17 ENCOUNTER — INITIAL PRENATAL (OUTPATIENT)
Dept: OBGYN CLINIC | Facility: CLINIC | Age: 35
End: 2025-07-17

## 2025-07-17 DIAGNOSIS — O09.521 MULTIGRAVIDA OF ADVANCED MATERNAL AGE IN FIRST TRIMESTER: ICD-10-CM

## 2025-07-17 DIAGNOSIS — Z3A.08 8 WEEKS GESTATION OF PREGNANCY: ICD-10-CM

## 2025-07-17 DIAGNOSIS — Z36.89 ENCOUNTER FOR OTHER SPECIFIED ANTENATAL SCREENING: Primary | ICD-10-CM

## 2025-07-17 PROCEDURE — OBC

## 2025-07-17 RX ORDER — MULTIVITAMIN WITH IRON
250 TABLET ORAL DAILY
COMMUNITY

## 2025-07-24 LAB
EXTERNAL ANTIBODY SCREEN: NORMAL
EXTERNAL HEMOGLOBIN: 12 G/DL
EXTERNAL HEPATITIS B SURFACE ANTIGEN: NEGATIVE
EXTERNAL HIV-1/2 AB-AG: NORMAL
EXTERNAL PLATELET COUNT: 288 K/ÂΜL
EXTERNAL RH FACTOR: POSITIVE
EXTERNAL RUBELLA IGG QUANTITATION: 3.04
EXTERNAL SYPHILIS TOTAL IGG/IGM SCREENING: NON REACTIVE

## 2025-07-25 LAB
ABO GROUP BLD: ABNORMAL
APPEARANCE UR: CLEAR
BACTERIA UR QL AUTO: ABNORMAL /HPF
BASOPHILS # BLD AUTO: 38 CELLS/UL (ref 0–200)
BASOPHILS NFR BLD AUTO: 0.6 %
BILIRUB UR QL STRIP: NEGATIVE
BLD GP AB SCN SERPL QL: ABNORMAL
COLOR UR: YELLOW
EOSINOPHIL # BLD AUTO: 50 CELLS/UL (ref 15–500)
EOSINOPHIL NFR BLD AUTO: 0.8 %
ERYTHROCYTE [DISTWIDTH] IN BLOOD BY AUTOMATED COUNT: 12.4 % (ref 11–15)
GLUCOSE UR QL STRIP: NEGATIVE
HBV SURFACE AG SERPL QL IA: ABNORMAL
HCT VFR BLD AUTO: 37.7 % (ref 35–45)
HCV AB SERPL QL IA: NORMAL
HGB BLD-MCNC: 12 G/DL (ref 11.7–15.5)
HGB UR QL STRIP: NEGATIVE
HIV 1+2 AB+HIV1 P24 AG SERPL QL IA: ABNORMAL
HYALINE CASTS #/AREA URNS LPF: ABNORMAL /LPF
KETONES UR QL STRIP: NEGATIVE
LEUKOCYTE ESTERASE UR QL STRIP: NEGATIVE
LYMPHOCYTES # BLD AUTO: 2066 CELLS/UL (ref 850–3900)
LYMPHOCYTES NFR BLD AUTO: 32.8 %
MCH RBC QN AUTO: 29.6 PG (ref 27–33)
MCHC RBC AUTO-ENTMCNC: 31.8 G/DL (ref 32–36)
MCV RBC AUTO: 93.1 FL (ref 80–100)
MONOCYTES # BLD AUTO: 372 CELLS/UL (ref 200–950)
MONOCYTES NFR BLD AUTO: 5.9 %
NEUTROPHILS # BLD AUTO: 3774 CELLS/UL (ref 1500–7800)
NEUTROPHILS NFR BLD AUTO: 59.9 %
NITRITE UR QL STRIP: NEGATIVE
PH UR STRIP: 7 [PH] (ref 5–8)
PLATELET # BLD AUTO: 288 THOUSAND/UL (ref 140–400)
PMV BLD REES-ECKER: 10.2 FL (ref 7.5–12.5)
PROT UR QL STRIP: NEGATIVE
RBC # BLD AUTO: 4.05 MILLION/UL (ref 3.8–5.1)
RBC #/AREA URNS HPF: ABNORMAL /HPF
RH BLD: ABNORMAL
RPR SER QL: ABNORMAL
RUBV IGG SERPL IA-ACNC: 3.04 INDEX
SP GR UR STRIP: 1.02 (ref 1–1.03)
SQUAMOUS #/AREA URNS HPF: ABNORMAL /HPF
WBC # BLD AUTO: 6.3 THOUSAND/UL (ref 3.8–10.8)
WBC #/AREA URNS HPF: ABNORMAL /HPF

## 2025-07-26 PROBLEM — Z87.51 HISTORY OF PRETERM DELIVERY: Status: ACTIVE | Noted: 2025-07-26

## 2025-07-26 PROBLEM — Z3A.11 11 WEEKS GESTATION OF PREGNANCY: Status: ACTIVE | Noted: 2025-07-02

## 2025-07-28 ENCOUNTER — INITIAL PRENATAL (OUTPATIENT)
Dept: OBGYN CLINIC | Facility: CLINIC | Age: 35
End: 2025-07-28

## 2025-07-28 VITALS
WEIGHT: 156 LBS | HEIGHT: 62 IN | SYSTOLIC BLOOD PRESSURE: 106 MMHG | BODY MASS INDEX: 28.71 KG/M2 | DIASTOLIC BLOOD PRESSURE: 62 MMHG

## 2025-07-28 DIAGNOSIS — Z3A.11 11 WEEKS GESTATION OF PREGNANCY: ICD-10-CM

## 2025-07-28 DIAGNOSIS — O09.521 MULTIGRAVIDA OF ADVANCED MATERNAL AGE IN FIRST TRIMESTER: ICD-10-CM

## 2025-07-28 DIAGNOSIS — G43.009 MIGRAINE WITHOUT AURA AND WITHOUT STATUS MIGRAINOSUS, NOT INTRACTABLE: Primary | ICD-10-CM

## 2025-07-28 DIAGNOSIS — Z87.51 HISTORY OF PRETERM DELIVERY: ICD-10-CM

## 2025-07-28 DIAGNOSIS — F90.9 ATTENTION DEFICIT HYPERACTIVITY DISORDER (ADHD), UNSPECIFIED ADHD TYPE: ICD-10-CM

## 2025-07-28 DIAGNOSIS — Z11.3 SCREEN FOR STD (SEXUALLY TRANSMITTED DISEASE): ICD-10-CM

## 2025-07-28 PROCEDURE — PNV: Performed by: STUDENT IN AN ORGANIZED HEALTH CARE EDUCATION/TRAINING PROGRAM

## 2025-07-29 LAB
C TRACH RRNA SPEC QL NAA+PROBE: NOT DETECTED
N GONORRHOEA RRNA SPEC QL NAA+PROBE: NOT DETECTED

## 2025-08-06 ENCOUNTER — ROUTINE PRENATAL (OUTPATIENT)
Dept: PERINATAL CARE | Facility: OTHER | Age: 35
End: 2025-08-06
Attending: OBSTETRICS & GYNECOLOGY
Payer: COMMERCIAL

## 2025-08-06 ENCOUNTER — ANCILLARY PROCEDURE (OUTPATIENT)
Dept: PERINATAL CARE | Facility: OTHER | Age: 35
End: 2025-08-06
Attending: STUDENT IN AN ORGANIZED HEALTH CARE EDUCATION/TRAINING PROGRAM
Payer: COMMERCIAL

## 2025-08-06 VITALS
HEART RATE: 70 BPM | HEIGHT: 62 IN | DIASTOLIC BLOOD PRESSURE: 74 MMHG | SYSTOLIC BLOOD PRESSURE: 118 MMHG | BODY MASS INDEX: 29.63 KG/M2 | WEIGHT: 161 LBS

## 2025-08-06 DIAGNOSIS — Z3A.13 13 WEEKS GESTATION OF PREGNANCY: ICD-10-CM

## 2025-08-06 DIAGNOSIS — R51.9 HEADACHE IN PREGNANCY, ANTEPARTUM, FIRST TRIMESTER: ICD-10-CM

## 2025-08-06 DIAGNOSIS — G43.009 MIGRAINE WITHOUT AURA AND WITHOUT STATUS MIGRAINOSUS, NOT INTRACTABLE: ICD-10-CM

## 2025-08-06 DIAGNOSIS — O09.521 MULTIGRAVIDA OF ADVANCED MATERNAL AGE IN FIRST TRIMESTER: ICD-10-CM

## 2025-08-06 DIAGNOSIS — O26.891 HEADACHE IN PREGNANCY, ANTEPARTUM, FIRST TRIMESTER: ICD-10-CM

## 2025-08-06 DIAGNOSIS — Z36.82 NUCHAL TRANSLUCENCY OF FETUS ON PRENATAL ULTRASOUND: ICD-10-CM

## 2025-08-06 DIAGNOSIS — Z36.0 ENCOUNTER FOR ANTENATAL SCREENING FOR CHROMOSOMAL ANOMALIES: Primary | ICD-10-CM

## 2025-08-06 DIAGNOSIS — O09.899 HISTORY OF PRETERM DELIVERY, CURRENTLY PREGNANT: ICD-10-CM

## 2025-08-08 PROBLEM — O26.891 HEADACHE IN PREGNANCY, ANTEPARTUM, FIRST TRIMESTER: Status: ACTIVE | Noted: 2025-08-08

## 2025-08-08 PROBLEM — R51.9 HEADACHE IN PREGNANCY, ANTEPARTUM, FIRST TRIMESTER: Status: ACTIVE | Noted: 2025-08-08

## 2025-08-08 PROBLEM — Z3A.13 13 WEEKS GESTATION OF PREGNANCY: Status: ACTIVE | Noted: 2025-07-02

## 2025-08-08 RX ORDER — LANOLIN ALCOHOL/MO/W.PET/CERES
400 CREAM (GRAM) TOPICAL DAILY
Qty: 90 TABLET | Refills: 3 | Status: SHIPPED | OUTPATIENT
Start: 2025-08-08

## 2025-08-10 LAB
CFDNA.FET/CFDNA.TOTAL SFR FETUS: NORMAL %
CFDNA.FET/CFDNA.TOTAL SFR FETUS: NORMAL %
CITATION REF LAB TEST: NORMAL
FET 13+18+21+X+Y ANEUP PLAS.CFDNA: NEGATIVE
FET CHR 21 TS PLAS.CFDNA QL: NEGATIVE
FET CHR 21 TS PLAS.CFDNA QL: NEGATIVE
FET MS X RISK WBC.DNA+CFDNA QL: NOT DETECTED
FET SEX PLAS.CFDNA DOSAGE CFDNA: NORMAL
FET TS 13 RISK PLAS.CFDNA QL: NEGATIVE
FET X + Y ANEUP RISK PLAS.CFDNA SEQ-IMP: NOT DETECTED
GA EST FROM CONCEPTION DATE: NORMAL D
GESTATIONAL AGE > 9:: YES
LAB DIRECTOR NAME PROVIDER: NORMAL
LABORATORY COMMENT REPORT: NORMAL
LIMITATIONS OF THE TEST: NORMAL
NEGATIVE PREDICTIVE VALUE: NORMAL
PERFORMANCE CHARACTERISTICS: NORMAL
POSITIVE PREDICTIVE VALUE: NORMAL
REF LAB TEST METHOD: NORMAL
SL AMB NOTE:: NORMAL
TEST PERFORMANCE INFO SPEC: NORMAL

## 2025-08-20 ENCOUNTER — TELEPHONE (OUTPATIENT)
Dept: PERINATAL CARE | Facility: OTHER | Age: 35
End: 2025-08-20